# Patient Record
Sex: MALE | Race: WHITE | NOT HISPANIC OR LATINO | Employment: OTHER | URBAN - NONMETROPOLITAN AREA
[De-identification: names, ages, dates, MRNs, and addresses within clinical notes are randomized per-mention and may not be internally consistent; named-entity substitution may affect disease eponyms.]

---

## 2023-03-02 PROBLEM — R10.9 ABDOMINAL PAIN: Status: ACTIVE | Noted: 2023-03-02

## 2023-03-02 PROBLEM — R14.0 ABDOMINAL DISTENSION: Status: ACTIVE | Noted: 2023-03-02

## 2023-03-02 PROBLEM — K59.00 CONSTIPATION: Status: ACTIVE | Noted: 2023-03-02

## 2023-03-02 PROBLEM — R06.02 SHORTNESS OF BREATH: Status: ACTIVE | Noted: 2023-03-02

## 2023-03-02 PROBLEM — I12.9 BENIGN HYPERTENSION WITH CKD (CHRONIC KIDNEY DISEASE) STAGE III (MULTI): Status: ACTIVE | Noted: 2023-03-02

## 2023-03-02 PROBLEM — E66.3 OVERWEIGHT WITH BODY MASS INDEX (BMI) OF 29 TO 29.9 IN ADULT: Status: ACTIVE | Noted: 2023-03-02

## 2023-03-02 PROBLEM — N13.8 BENIGN PROSTATIC HYPERPLASIA WITH URINARY OBSTRUCTION AND OTHER LOWER URINARY TRACT SYMPTOMS: Status: ACTIVE | Noted: 2023-03-02

## 2023-03-02 PROBLEM — N18.30 BENIGN HYPERTENSION WITH CKD (CHRONIC KIDNEY DISEASE) STAGE III (MULTI): Status: ACTIVE | Noted: 2023-03-02

## 2023-03-02 PROBLEM — R19.8 CHANGE IN BOWEL MOVEMENT: Status: ACTIVE | Noted: 2023-03-02

## 2023-03-02 PROBLEM — Z86.010 PERSONAL HISTORY OF COLONIC POLYPS: Status: ACTIVE | Noted: 2023-03-02

## 2023-03-02 PROBLEM — Z86.0100 PERSONAL HISTORY OF COLONIC POLYPS: Status: ACTIVE | Noted: 2023-03-02

## 2023-03-02 PROBLEM — K21.9 GERD (GASTROESOPHAGEAL REFLUX DISEASE): Status: ACTIVE | Noted: 2023-03-02

## 2023-03-02 PROBLEM — S39.012A LUMBAR STRAIN: Status: ACTIVE | Noted: 2023-03-02

## 2023-03-02 PROBLEM — E78.5 HYPERLIPIDEMIA: Status: ACTIVE | Noted: 2023-03-02

## 2023-03-02 PROBLEM — R35.1 NOCTURIA: Status: ACTIVE | Noted: 2023-03-02

## 2023-03-02 PROBLEM — R09.89 CAROTID BRUIT: Status: ACTIVE | Noted: 2023-03-02

## 2023-03-02 PROBLEM — N40.1 BENIGN PROSTATIC HYPERPLASIA WITH URINARY OBSTRUCTION AND OTHER LOWER URINARY TRACT SYMPTOMS: Status: ACTIVE | Noted: 2023-03-02

## 2023-03-02 PROBLEM — I10 HTN (HYPERTENSION): Status: ACTIVE | Noted: 2023-03-02

## 2023-03-02 PROBLEM — N52.9 ERECTILE DYSFUNCTION: Status: ACTIVE | Noted: 2023-03-02

## 2023-03-02 PROBLEM — I73.00 RAYNAUD'S DISEASE: Status: ACTIVE | Noted: 2023-03-02

## 2023-03-02 RX ORDER — ATORVASTATIN CALCIUM 10 MG/1
1 TABLET, FILM COATED ORAL NIGHTLY
COMMUNITY
Start: 2019-01-16 | End: 2023-06-19 | Stop reason: SDUPTHER

## 2023-03-02 RX ORDER — ASPIRIN 81 MG/1
1 TABLET ORAL DAILY
COMMUNITY
Start: 2019-10-28

## 2023-03-02 RX ORDER — LOSARTAN POTASSIUM 50 MG/1
1 TABLET ORAL DAILY
COMMUNITY
Start: 2018-10-31 | End: 2023-11-13 | Stop reason: SDUPTHER

## 2023-03-02 RX ORDER — CARVEDILOL 6.25 MG/1
1 TABLET ORAL 2 TIMES DAILY
COMMUNITY
Start: 2019-01-14 | End: 2023-11-13 | Stop reason: SDUPTHER

## 2023-03-02 RX ORDER — SILDENAFIL CITRATE 20 MG/1
20 TABLET ORAL
COMMUNITY
Start: 2019-01-14 | End: 2023-11-13 | Stop reason: SDUPTHER

## 2023-03-02 RX ORDER — CILOSTAZOL 100 MG/1
1 TABLET ORAL 2 TIMES DAILY
COMMUNITY
Start: 2019-05-16 | End: 2023-11-13 | Stop reason: SDUPTHER

## 2023-03-02 RX ORDER — PRAZOSIN HYDROCHLORIDE 1 MG/1
1 CAPSULE ORAL 2 TIMES DAILY
COMMUNITY
Start: 2018-11-09 | End: 2023-11-13 | Stop reason: SDUPTHER

## 2023-03-02 RX ORDER — OMEPRAZOLE 40 MG/1
1 CAPSULE, DELAYED RELEASE ORAL DAILY
COMMUNITY
Start: 2018-12-20 | End: 2023-11-13 | Stop reason: SDUPTHER

## 2023-03-06 ENCOUNTER — OFFICE VISIT (OUTPATIENT)
Dept: PRIMARY CARE | Facility: CLINIC | Age: 73
End: 2023-03-06
Payer: MEDICARE

## 2023-03-06 VITALS
HEART RATE: 68 BPM | WEIGHT: 177 LBS | BODY MASS INDEX: 29.45 KG/M2 | OXYGEN SATURATION: 97 % | DIASTOLIC BLOOD PRESSURE: 73 MMHG | SYSTOLIC BLOOD PRESSURE: 133 MMHG

## 2023-03-06 DIAGNOSIS — R10.32 ACUTE LEFT LOWER QUADRANT PAIN: Primary | ICD-10-CM

## 2023-03-06 PROCEDURE — 1159F MED LIST DOCD IN RCRD: CPT | Performed by: PHYSICIAN ASSISTANT

## 2023-03-06 PROCEDURE — 99214 OFFICE O/P EST MOD 30 MIN: CPT | Performed by: PHYSICIAN ASSISTANT

## 2023-03-06 PROCEDURE — 3075F SYST BP GE 130 - 139MM HG: CPT | Performed by: PHYSICIAN ASSISTANT

## 2023-03-06 PROCEDURE — 1036F TOBACCO NON-USER: CPT | Performed by: PHYSICIAN ASSISTANT

## 2023-03-06 PROCEDURE — 3078F DIAST BP <80 MM HG: CPT | Performed by: PHYSICIAN ASSISTANT

## 2023-03-06 RX ORDER — AMOXICILLIN AND CLAVULANATE POTASSIUM 875; 125 MG/1; MG/1
1 TABLET, FILM COATED ORAL 2 TIMES DAILY
Qty: 20 TABLET | Refills: 0 | Status: SHIPPED | OUTPATIENT
Start: 2023-03-06 | End: 2023-03-16

## 2023-03-06 NOTE — PROGRESS NOTES
Subjective   Patient ID: Yohan Mccain is a 72 y.o. male who presents for Abdominal Pain (Left side), Urinary Retention, and Constipation.    HPI    Patient presents for evaluation of left lower quadrant pain.  Patient reports 2 to 3 months of pain in the left lower quadrant that waxes and wanes with associated diarrhea.  Patient reports some possible instances of constipation but mostly has had liquid stool over the past 2 to 3 months.  Patient denies fever or chills.  No melena or bright red blood per rectum.  No nausea or vomiting however, patient does report new food intolerances.  Patient states that there is associated bloating, bloating is bad, there is associated urinary retention that is relieved with bowel movement.  No fecaluria or pneumaturia.  Patient does have a history of colon polyps but had colonoscopy just over 1 year ago and was informed that follow-up would not be for 5 years.    Review of Systems    Review of Systems   Constitutional:  See HPI .    Gastrointestinal: See HPI  Genitourinary: See HPI  Neurologic:  Alert and oriented X4, No numbness, No tingling.    All other systems are negative       Objective     /73   Pulse 68   Wt 80.3 kg (177 lb)   SpO2 97%   BMI 29.45 kg/m²     Physical Exam    General:  Alert and oriented, No acute distress.    Gastrointestinal: Left lower quadrant tenderness to palpation with involuntary guarding; no rebound or referred  Integumentary:  Warm, Dry, Intact, No pallor, No rash.    Neurologic:  Alert, Oriented, Normal sensory, Normal motor function, No focal deficits, Cranial Nerves II-XII are grossly intact,   Psychiatric:  Cooperative, Appropriate mood & affect.      Assessment/Plan     Acute left lower quadrant abdominal pain: Suspect subacute diverticulitis.  Start Augmentin twice daily x7 days.  CT abdomen pelvis with oral IV contrast ordered with CBC BMP, LFTs.  Patient advised that should fever present or pain worsen, to ER for further  evaluation.    Problem List Items Addressed This Visit    None  Visit Diagnoses       Acute left lower quadrant pain    -  Primary    Relevant Medications    amoxicillin-pot clavulanate (Augmentin) 875-125 mg tablet    Other Relevant Orders    CT abdomen pelvis w IV contrast    CBC    Basic Metabolic Panel    Hepatic Function Panel

## 2023-03-13 ENCOUNTER — LAB (OUTPATIENT)
Dept: LAB | Facility: LAB | Age: 73
End: 2023-03-13
Payer: MEDICARE

## 2023-03-13 DIAGNOSIS — R10.32 ACUTE LEFT LOWER QUADRANT PAIN: ICD-10-CM

## 2023-03-13 LAB
ALANINE AMINOTRANSFERASE (SGPT) (U/L) IN SER/PLAS: 12 U/L (ref 10–52)
ALBUMIN (G/DL) IN SER/PLAS: 4.3 G/DL (ref 3.4–5)
ALKALINE PHOSPHATASE (U/L) IN SER/PLAS: 41 U/L (ref 33–136)
ANION GAP IN SER/PLAS: 12 MMOL/L (ref 10–20)
ASPARTATE AMINOTRANSFERASE (SGOT) (U/L) IN SER/PLAS: 12 U/L (ref 9–39)
BILIRUBIN DIRECT (MG/DL) IN SER/PLAS: 0.2 MG/DL (ref 0–0.3)
BILIRUBIN TOTAL (MG/DL) IN SER/PLAS: 0.8 MG/DL (ref 0–1.2)
CALCIUM (MG/DL) IN SER/PLAS: 9.7 MG/DL (ref 8.6–10.3)
CARBON DIOXIDE, TOTAL (MMOL/L) IN SER/PLAS: 28 MMOL/L (ref 21–32)
CHLORIDE (MMOL/L) IN SER/PLAS: 104 MMOL/L (ref 98–107)
CREATININE (MG/DL) IN SER/PLAS: 1.36 MG/DL (ref 0.5–1.3)
ERYTHROCYTE DISTRIBUTION WIDTH (RATIO) BY AUTOMATED COUNT: 13.2 % (ref 11.5–14.5)
ERYTHROCYTE MEAN CORPUSCULAR HEMOGLOBIN CONCENTRATION (G/DL) BY AUTOMATED: 31.8 G/DL (ref 32–36)
ERYTHROCYTE MEAN CORPUSCULAR VOLUME (FL) BY AUTOMATED COUNT: 93 FL (ref 80–100)
ERYTHROCYTES (10*6/UL) IN BLOOD BY AUTOMATED COUNT: 4.45 X10E12/L (ref 4.5–5.9)
GFR MALE: 56 ML/MIN/1.73M2
GLUCOSE (MG/DL) IN SER/PLAS: 91 MG/DL (ref 74–99)
HEMATOCRIT (%) IN BLOOD BY AUTOMATED COUNT: 41.2 % (ref 41–52)
HEMOGLOBIN (G/DL) IN BLOOD: 13.1 G/DL (ref 13.5–17.5)
LEUKOCYTES (10*3/UL) IN BLOOD BY AUTOMATED COUNT: 4.7 X10E9/L (ref 4.4–11.3)
PLATELETS (10*3/UL) IN BLOOD AUTOMATED COUNT: 250 X10E9/L (ref 150–450)
POTASSIUM (MMOL/L) IN SER/PLAS: 4.6 MMOL/L (ref 3.5–5.3)
PROTEIN TOTAL: 6.5 G/DL (ref 6.4–8.2)
SODIUM (MMOL/L) IN SER/PLAS: 139 MMOL/L (ref 136–145)
UREA NITROGEN (MG/DL) IN SER/PLAS: 15 MG/DL (ref 6–23)

## 2023-03-13 PROCEDURE — 85027 COMPLETE CBC AUTOMATED: CPT

## 2023-03-13 PROCEDURE — 80048 BASIC METABOLIC PNL TOTAL CA: CPT

## 2023-03-13 PROCEDURE — 80076 HEPATIC FUNCTION PANEL: CPT

## 2023-03-13 PROCEDURE — 36415 COLL VENOUS BLD VENIPUNCTURE: CPT

## 2023-03-14 ENCOUNTER — TELEPHONE (OUTPATIENT)
Dept: PRIMARY CARE | Facility: CLINIC | Age: 73
End: 2023-03-14
Payer: MEDICARE

## 2023-03-14 NOTE — TELEPHONE ENCOUNTER
----- Message from Bobby Villela PA-C sent at 3/14/2023 10:11 AM EDT -----  Patient's labs are unremarkable.  Patient's CT abdomen showed chronic fat stranding in the left lower quadrant, etiology is unclear.  This is similar to CT findings in 2021 for same complaint.  Patient was referred to general surgery at that time and is still a patient there.  Recommend contacting them regarding the CT results and chronic abdominal pain.  The patient does not need a referral.  Please contact patient with results and plan.  Thank you.

## 2023-06-19 ENCOUNTER — OFFICE VISIT (OUTPATIENT)
Dept: PRIMARY CARE | Facility: CLINIC | Age: 73
End: 2023-06-19
Payer: MEDICARE

## 2023-06-19 VITALS
BODY MASS INDEX: 29.82 KG/M2 | HEIGHT: 65 IN | SYSTOLIC BLOOD PRESSURE: 125 MMHG | HEART RATE: 77 BPM | DIASTOLIC BLOOD PRESSURE: 80 MMHG | WEIGHT: 179 LBS

## 2023-06-19 DIAGNOSIS — E78.2 MIXED HYPERLIPIDEMIA: ICD-10-CM

## 2023-06-19 DIAGNOSIS — K21.9 GASTROESOPHAGEAL REFLUX DISEASE WITHOUT ESOPHAGITIS: ICD-10-CM

## 2023-06-19 DIAGNOSIS — N40.1 BENIGN PROSTATIC HYPERPLASIA WITH URINARY OBSTRUCTION AND OTHER LOWER URINARY TRACT SYMPTOMS: ICD-10-CM

## 2023-06-19 DIAGNOSIS — Z01.818 PRE-OP EXAM: Primary | ICD-10-CM

## 2023-06-19 DIAGNOSIS — I10 PRIMARY HYPERTENSION: ICD-10-CM

## 2023-06-19 DIAGNOSIS — N13.8 BENIGN PROSTATIC HYPERPLASIA WITH URINARY OBSTRUCTION AND OTHER LOWER URINARY TRACT SYMPTOMS: ICD-10-CM

## 2023-06-19 DIAGNOSIS — I73.00 RAYNAUD'S DISEASE WITHOUT GANGRENE: ICD-10-CM

## 2023-06-19 PROCEDURE — 3074F SYST BP LT 130 MM HG: CPT | Performed by: INTERNAL MEDICINE

## 2023-06-19 PROCEDURE — 1160F RVW MEDS BY RX/DR IN RCRD: CPT | Performed by: INTERNAL MEDICINE

## 2023-06-19 PROCEDURE — 1159F MED LIST DOCD IN RCRD: CPT | Performed by: INTERNAL MEDICINE

## 2023-06-19 PROCEDURE — 99214 OFFICE O/P EST MOD 30 MIN: CPT | Performed by: INTERNAL MEDICINE

## 2023-06-19 PROCEDURE — 3079F DIAST BP 80-89 MM HG: CPT | Performed by: INTERNAL MEDICINE

## 2023-06-19 PROCEDURE — 1036F TOBACCO NON-USER: CPT | Performed by: INTERNAL MEDICINE

## 2023-06-19 RX ORDER — ATORVASTATIN CALCIUM 10 MG/1
10 TABLET, FILM COATED ORAL NIGHTLY
Qty: 90 TABLET | Refills: 3 | Status: SHIPPED | OUTPATIENT
Start: 2023-06-19

## 2023-06-19 ASSESSMENT — ENCOUNTER SYMPTOMS
BACK PAIN: 0
ABDOMINAL PAIN: 0
COUGH: 0
SINUS PRESSURE: 0
ABDOMINAL DISTENTION: 0
SHORTNESS OF BREATH: 0
ARTHRALGIAS: 0
ACTIVITY CHANGE: 0
CHILLS: 0

## 2023-06-19 NOTE — PROGRESS NOTES
"Subjective   Patient ID: Yohan Mccain is a 72 y.o. male who presents for Pre-op Exam (B/L upper lids ptosis surgery) and Follow-up (6 month).  HPI  Patient is here today for pre- op exam.  Patient is going to being having bilateral blepharoplasty, had lower eyelids done two months,. Patient reports no issues with anesthesia and the procedure.   Patient has no sob or chest pain on exertion.     Review of Systems   Constitutional:  Negative for activity change and chills.   HENT:  Negative for congestion, mouth sores, nosebleeds, sinus pressure and sneezing.    Respiratory:  Negative for cough and shortness of breath.    Cardiovascular:  Negative for chest pain and leg swelling.   Gastrointestinal:  Negative for abdominal distention and abdominal pain.   Musculoskeletal:  Negative for arthralgias and back pain.       Objective   /80 (BP Location: Left arm, Patient Position: Sitting, BP Cuff Size: Adult)   Pulse 77   Ht 1.651 m (5' 5\")   Wt 81.2 kg (179 lb)   BMI 29.79 kg/m²     Physical Exam  Constitutional:       General: He is not in acute distress.     Appearance: Normal appearance.   HENT:      Head: Normocephalic.      Nose: Nose normal.      Mouth/Throat:      Mouth: Mucous membranes are dry.      Pharynx: No oropharyngeal exudate.   Eyes:      General:         Right eye: No discharge.         Left eye: No discharge.      Extraocular Movements: Extraocular movements intact.      Pupils: Pupils are equal, round, and reactive to light.   Cardiovascular:      Rate and Rhythm: Normal rate and regular rhythm.      Heart sounds: No murmur heard.     No gallop.   Pulmonary:      Effort: Pulmonary effort is normal. No respiratory distress.      Breath sounds: Normal breath sounds. No wheezing.   Musculoskeletal:         General: No swelling. Normal range of motion.   Skin:     General: Skin is warm and dry.      Coloration: Skin is not jaundiced.   Neurological:      General: No focal deficit present.      " Mental Status: He is alert and oriented to person, place, and time.      Cranial Nerves: No cranial nerve deficit.   Psychiatric:         Mood and Affect: Mood normal.         Behavior: Behavior normal.           Assessment/Plan   Problem List Items Addressed This Visit       Benign prostatic hyperplasia with urinary obstruction and other lower urinary tract symptoms    GERD (gastroesophageal reflux disease)    HTN (hypertension) - Primary    Hyperlipidemia    Raynaud's disease     Pre-op for therese blephoraoplasty  - normal bloodwork in March, normal heart testing in January, no chest pain or sob   - Pt at this time is medically optimized and is cleared to proceed with procedure     2. Mildly elevated Cr likely due to dehydration from diverticulitis, will repeat bmp     3. HTN, controlled   - continue losartan 50mg po daily     4. Raynauds, severe   - following with vascular   - on revatio 20mg po 5x a day   - on pletal 100mg po bid   - on coreg 6.25mg po daily   - on asa 81mg po daily     5. HLD   - on lipitor 10mg po daily         Final diagnoses:   [I10] Primary hypertension   [I73.00] Raynaud's disease without gangrene   [K21.9] Gastroesophageal reflux disease without esophagitis   [N40.1, N13.8] Benign prostatic hyperplasia with urinary obstruction and other lower urinary tract symptoms   [E78.2] Mixed hyperlipidemia

## 2023-07-06 ENCOUNTER — LAB (OUTPATIENT)
Dept: LAB | Facility: LAB | Age: 73
End: 2023-07-06
Payer: MEDICARE

## 2023-07-06 DIAGNOSIS — I10 PRIMARY HYPERTENSION: ICD-10-CM

## 2023-07-06 LAB
ANION GAP IN SER/PLAS: 9 MMOL/L (ref 10–20)
CALCIUM (MG/DL) IN SER/PLAS: 9.9 MG/DL (ref 8.6–10.3)
CARBON DIOXIDE, TOTAL (MMOL/L) IN SER/PLAS: 29 MMOL/L (ref 21–32)
CHLORIDE (MMOL/L) IN SER/PLAS: 104 MMOL/L (ref 98–107)
CREATININE (MG/DL) IN SER/PLAS: 1.6 MG/DL (ref 0.5–1.3)
GFR MALE: 46 ML/MIN/1.73M2
GLUCOSE (MG/DL) IN SER/PLAS: 103 MG/DL (ref 74–99)
POTASSIUM (MMOL/L) IN SER/PLAS: 5 MMOL/L (ref 3.5–5.3)
SODIUM (MMOL/L) IN SER/PLAS: 137 MMOL/L (ref 136–145)
UREA NITROGEN (MG/DL) IN SER/PLAS: 18 MG/DL (ref 6–23)

## 2023-07-06 PROCEDURE — 36415 COLL VENOUS BLD VENIPUNCTURE: CPT

## 2023-07-06 PROCEDURE — 80048 BASIC METABOLIC PNL TOTAL CA: CPT

## 2023-11-13 DIAGNOSIS — R09.89 CAROTID BRUIT, UNSPECIFIED LATERALITY: ICD-10-CM

## 2023-11-13 DIAGNOSIS — I10 PRIMARY HYPERTENSION: Primary | ICD-10-CM

## 2023-11-13 DIAGNOSIS — N52.8 OTHER MALE ERECTILE DYSFUNCTION: ICD-10-CM

## 2023-11-13 DIAGNOSIS — N13.8 BENIGN PROSTATIC HYPERPLASIA WITH URINARY OBSTRUCTION AND OTHER LOWER URINARY TRACT SYMPTOMS: ICD-10-CM

## 2023-11-13 DIAGNOSIS — N40.1 BENIGN PROSTATIC HYPERPLASIA WITH URINARY OBSTRUCTION AND OTHER LOWER URINARY TRACT SYMPTOMS: ICD-10-CM

## 2023-11-13 DIAGNOSIS — K21.9 GASTROESOPHAGEAL REFLUX DISEASE WITHOUT ESOPHAGITIS: ICD-10-CM

## 2023-11-13 RX ORDER — LOSARTAN POTASSIUM 50 MG/1
50 TABLET ORAL DAILY
Qty: 90 TABLET | Refills: 3 | Status: SHIPPED | OUTPATIENT
Start: 2023-11-13 | End: 2024-01-22 | Stop reason: SDUPTHER

## 2023-11-13 RX ORDER — PRAZOSIN HYDROCHLORIDE 1 MG/1
1 CAPSULE ORAL 2 TIMES DAILY
Qty: 180 CAPSULE | Refills: 3 | Status: SHIPPED | OUTPATIENT
Start: 2023-11-13

## 2023-11-13 RX ORDER — CARVEDILOL 6.25 MG/1
6.25 TABLET ORAL 2 TIMES DAILY
Qty: 90 TABLET | Refills: 3 | Status: SHIPPED | OUTPATIENT
Start: 2023-11-13

## 2023-11-13 RX ORDER — CILOSTAZOL 100 MG/1
100 TABLET ORAL 2 TIMES DAILY
Qty: 180 TABLET | Refills: 3 | Status: SHIPPED | OUTPATIENT
Start: 2023-11-13

## 2023-11-13 RX ORDER — SILDENAFIL CITRATE 20 MG/1
20 TABLET ORAL
Qty: 30 TABLET | Refills: 11 | Status: SHIPPED | OUTPATIENT
Start: 2023-11-13 | End: 2023-12-06 | Stop reason: SDUPTHER

## 2023-11-13 RX ORDER — OMEPRAZOLE 40 MG/1
40 CAPSULE, DELAYED RELEASE ORAL DAILY
Qty: 90 CAPSULE | Refills: 3 | Status: SHIPPED | OUTPATIENT
Start: 2023-11-13

## 2023-12-05 ENCOUNTER — LAB (OUTPATIENT)
Dept: LAB | Facility: LAB | Age: 73
End: 2023-12-05
Payer: MEDICARE

## 2023-12-05 DIAGNOSIS — N40.1 BENIGN PROSTATIC HYPERPLASIA WITH LOWER URINARY TRACT SYMPTOMS: Primary | ICD-10-CM

## 2023-12-05 LAB — PSA SERPL-MCNC: 1.07 NG/ML

## 2023-12-05 PROCEDURE — 36415 COLL VENOUS BLD VENIPUNCTURE: CPT

## 2023-12-05 PROCEDURE — 84153 ASSAY OF PSA TOTAL: CPT

## 2023-12-06 DIAGNOSIS — N52.8 OTHER MALE ERECTILE DYSFUNCTION: ICD-10-CM

## 2023-12-06 DIAGNOSIS — I73.00 RAYNAUD'S DISEASE WITHOUT GANGRENE: ICD-10-CM

## 2023-12-06 RX ORDER — SILDENAFIL CITRATE 20 MG/1
20 TABLET ORAL
Qty: 150 TABLET | Refills: 11 | Status: SHIPPED | OUTPATIENT
Start: 2023-12-06

## 2023-12-13 ENCOUNTER — OFFICE VISIT (OUTPATIENT)
Dept: UROLOGY | Facility: CLINIC | Age: 73
End: 2023-12-13
Payer: MEDICARE

## 2023-12-13 VITALS — BODY MASS INDEX: 29.29 KG/M2 | RESPIRATION RATE: 16 BRPM | WEIGHT: 176 LBS

## 2023-12-13 DIAGNOSIS — N13.8 BENIGN PROSTATIC HYPERPLASIA WITH URINARY OBSTRUCTION AND OTHER LOWER URINARY TRACT SYMPTOMS: ICD-10-CM

## 2023-12-13 DIAGNOSIS — N40.1 BENIGN PROSTATIC HYPERPLASIA WITH URINARY OBSTRUCTION AND OTHER LOWER URINARY TRACT SYMPTOMS: ICD-10-CM

## 2023-12-13 DIAGNOSIS — R35.1 NOCTURIA: ICD-10-CM

## 2023-12-13 DIAGNOSIS — N52.9 ERECTILE DYSFUNCTION, UNSPECIFIED ERECTILE DYSFUNCTION TYPE: ICD-10-CM

## 2023-12-13 PROCEDURE — 1160F RVW MEDS BY RX/DR IN RCRD: CPT | Performed by: UROLOGY

## 2023-12-13 PROCEDURE — 1036F TOBACCO NON-USER: CPT | Performed by: UROLOGY

## 2023-12-13 PROCEDURE — 1159F MED LIST DOCD IN RCRD: CPT | Performed by: UROLOGY

## 2023-12-13 PROCEDURE — 99214 OFFICE O/P EST MOD 30 MIN: CPT | Performed by: UROLOGY

## 2023-12-13 ASSESSMENT — ENCOUNTER SYMPTOMS
PSYCHIATRIC NEGATIVE: 1
NAUSEA: 0
SHORTNESS OF BREATH: 0
CHILLS: 0
DIFFICULTY URINATING: 0
COUGH: 0
FEVER: 0
EYES NEGATIVE: 1
ENDOCRINE NEGATIVE: 1
ALLERGIC/IMMUNOLOGIC NEGATIVE: 1

## 2023-12-13 NOTE — PROGRESS NOTES
Subjective   Patient ID: Yohan Mccain is a 73 y.o. male.    HPI  Patient is here for yearly f/u. Most recent PSA was 1.07 on 12/23. Prior PSA was 0.91 on 12/22. Prior PSA was 0.67 on 11/2021. Prior PSA was 0.72 on 12/2020. Chronic BPH sx are mild and stable. Some urgency and frequency. Denies dysuria. Denies hematuria. Nocturia x1. No medications for the prostate. ED is mild. Patient is taking Sildenafil for Raynaud's disease.       Review of Systems   Constitutional:  Negative for chills and fever.   HENT: Negative.     Eyes: Negative.    Respiratory:  Negative for cough and shortness of breath.    Cardiovascular:  Negative for chest pain and leg swelling.   Gastrointestinal:  Negative for nausea.   Endocrine: Negative.    Genitourinary:  Negative for difficulty urinating.        Negative except for documented in HPI   Allergic/Immunologic: Negative.    Neurological:         Alert & oriented X 3   Hematological:         Denies blood thinners   Psychiatric/Behavioral: Negative.         Objective   Physical Exam  Vitals and nursing note reviewed.   Constitutional:       General: He is not in acute distress.     Appearance: Normal appearance.   Pulmonary:      Effort: Pulmonary effort is normal.   Abdominal:      Tenderness: There is no abdominal tenderness.   Genitourinary:     Comments: Kidneys non palpable bilaterally  Bladder non palpable or tender  Scrotum no mass, No hydrocele  Epididymis- No spermatocele. Non Tender.  Testicles: No mass. WNL  Urethra: No discharge  Penis within normal limits... No lesions. Circumcised  Prostate - symmetric, no nodules. Smooth  Seminal Vesicals: No mass.  Sphincter tone: normal  Neurological:      Mental Status: He is alert.         Assessment/Plan       Diagnoses and all orders for this visit:  Benign prostatic hyperplasia with urinary obstruction and other lower urinary tract symptoms  Erectile dysfunction, unspecified erectile dysfunction type  Nocturia    All available  PSA values reviewed, Options discussed. Questions answered.   Diet changes for prostate health discussed and educational information given. Pros/Cons of prostate health supplements discussed.   Treatment options for LUTS reviewed  Discussed timed voiding. Discussed fluid and caffeine intake  Treatment options for ED reviewed.  Continue Sildenafil-patient actually uses for Raynauds  Lifestyle change to help prevent UTIs discussed. Encouraged fluid intake.    F/U with PSA 1 year

## 2023-12-18 ENCOUNTER — OFFICE VISIT (OUTPATIENT)
Dept: PRIMARY CARE | Facility: CLINIC | Age: 73
End: 2023-12-18
Payer: MEDICARE

## 2023-12-18 VITALS
WEIGHT: 178 LBS | HEART RATE: 67 BPM | BODY MASS INDEX: 29.66 KG/M2 | DIASTOLIC BLOOD PRESSURE: 76 MMHG | SYSTOLIC BLOOD PRESSURE: 124 MMHG | HEIGHT: 65 IN

## 2023-12-18 DIAGNOSIS — N18.30 BENIGN HYPERTENSION WITH CKD (CHRONIC KIDNEY DISEASE) STAGE III (MULTI): ICD-10-CM

## 2023-12-18 DIAGNOSIS — I10 PRIMARY HYPERTENSION: ICD-10-CM

## 2023-12-18 DIAGNOSIS — I73.00 RAYNAUD'S DISEASE WITHOUT GANGRENE: ICD-10-CM

## 2023-12-18 DIAGNOSIS — Z87.891 FORMER SMOKER: ICD-10-CM

## 2023-12-18 DIAGNOSIS — N40.1 BENIGN PROSTATIC HYPERPLASIA WITH URINARY OBSTRUCTION AND OTHER LOWER URINARY TRACT SYMPTOMS: ICD-10-CM

## 2023-12-18 DIAGNOSIS — I12.9 BENIGN HYPERTENSION WITH CKD (CHRONIC KIDNEY DISEASE) STAGE III (MULTI): ICD-10-CM

## 2023-12-18 DIAGNOSIS — N52.9 ERECTILE DYSFUNCTION, UNSPECIFIED ERECTILE DYSFUNCTION TYPE: ICD-10-CM

## 2023-12-18 DIAGNOSIS — K21.9 GASTROESOPHAGEAL REFLUX DISEASE WITHOUT ESOPHAGITIS: ICD-10-CM

## 2023-12-18 DIAGNOSIS — Z12.2 SCREENING FOR LUNG CANCER: ICD-10-CM

## 2023-12-18 DIAGNOSIS — Z00.00 MEDICARE ANNUAL WELLNESS VISIT, SUBSEQUENT: ICD-10-CM

## 2023-12-18 DIAGNOSIS — E78.2 MIXED HYPERLIPIDEMIA: Primary | ICD-10-CM

## 2023-12-18 DIAGNOSIS — N13.8 BENIGN PROSTATIC HYPERPLASIA WITH URINARY OBSTRUCTION AND OTHER LOWER URINARY TRACT SYMPTOMS: ICD-10-CM

## 2023-12-18 PROCEDURE — 3074F SYST BP LT 130 MM HG: CPT | Performed by: INTERNAL MEDICINE

## 2023-12-18 PROCEDURE — 1160F RVW MEDS BY RX/DR IN RCRD: CPT | Performed by: INTERNAL MEDICINE

## 2023-12-18 PROCEDURE — 99214 OFFICE O/P EST MOD 30 MIN: CPT | Performed by: INTERNAL MEDICINE

## 2023-12-18 PROCEDURE — 3078F DIAST BP <80 MM HG: CPT | Performed by: INTERNAL MEDICINE

## 2023-12-18 PROCEDURE — G0439 PPPS, SUBSEQ VISIT: HCPCS | Performed by: INTERNAL MEDICINE

## 2023-12-18 PROCEDURE — G0444 DEPRESSION SCREEN ANNUAL: HCPCS | Performed by: INTERNAL MEDICINE

## 2023-12-18 PROCEDURE — 1036F TOBACCO NON-USER: CPT | Performed by: INTERNAL MEDICINE

## 2023-12-18 PROCEDURE — 1159F MED LIST DOCD IN RCRD: CPT | Performed by: INTERNAL MEDICINE

## 2023-12-18 NOTE — PROGRESS NOTES
Chief Complaint: Medicare Wellness Exam/Comprehensive Problem Focused Follow Up and Physical Exam    HPI:  Patient is here today for MW.   Patient reports that he is doing well.       Active Problem List  Patient Active Problem List   Diagnosis    Abdominal distension    Abdominal pain    Benign hypertension with CKD (chronic kidney disease) stage III (CMS/HCC)    Benign prostatic hyperplasia with urinary obstruction and other lower urinary tract symptoms    Carotid bruit    Change in bowel movement    Constipation    Erectile dysfunction    GERD (gastroesophageal reflux disease)    HTN (hypertension)    Hyperlipidemia    Lumbar strain    Nocturia    Personal history of colonic polyps    Shortness of breath    Raynaud's disease    Overweight with body mass index (BMI) of 29 to 29.9 in adult       Comprehensive Medical/Surgical/Social/Family History  History reviewed. No pertinent past medical history.  Past Surgical History:   Procedure Laterality Date    OTHER SURGICAL HISTORY  10/28/2019    Cataract surgery    OTHER SURGICAL HISTORY  10/28/2019    Colonoscopy    OTHER SURGICAL HISTORY  10/28/2019    Esophagogastroduodenoscopy    OTHER SURGICAL HISTORY  10/28/2019    Back surgery    OTHER SURGICAL HISTORY  06/22/2021    Colonoscopy     Social History     Tobacco Use    Smoking status: Former     Types: Cigarettes    Smokeless tobacco: Former   Vaping Use    Vaping Use: Never used   Substance Use Topics    Alcohol use: Not Currently    Drug use: Never     Family History   Problem Relation Name Age of Onset    Kidney cancer Mother          s/p nephrectomy and chemo    Coronary artery disease Father          multiple small MIs    Heart attack Father      Colon cancer Paternal Great-Grandmother           Allergies and Medications  Patient has no known allergies.  Current Outpatient Medications on File Prior to Visit   Medication Sig Dispense Refill    aspirin 81 mg EC tablet Take 1 tablet (81 mg) by mouth once daily.  As directed      atorvastatin (Lipitor) 10 mg tablet Take 1 tablet (10 mg) by mouth once daily at bedtime. 90 tablet 3    carvedilol (Coreg) 6.25 mg tablet Take 1 tablet (6.25 mg) by mouth 2 times a day. 90 tablet 3    cilostazol (Pletal) 100 mg tablet Take 1 tablet (100 mg) by mouth 2 times a day. 180 tablet 3    losartan (Cozaar) 50 mg tablet Take 1 tablet (50 mg) by mouth once daily. As directed 90 tablet 3    omeprazole (PriLOSEC) 40 mg DR capsule Take 1 capsule (40 mg) by mouth once daily. 90 capsule 3    prazosin (Minipress) 1 mg capsule Take 1 capsule (1 mg) by mouth 2 times a day. 180 capsule 3    sildenafil (Revatio) 20 mg tablet Take 1 tablet (20 mg) by mouth 5 times a day. (2 tabs in morning/ 1 tab at noon / 2 tabs in evening) 150 tablet 11     No current facility-administered medications on file prior to visit.       Medicare Wellness Questionnaire        How have you been on Medicare less than a year ? No  Have you had a Medicare Wellness exam before ? Yes  Have you had any surgeries in the last year ? No  Have you developed any new diseases in the last year ? No  Have any close family members developed new diseases in the last year ? No  Have you been to a the hospital in the last year ? No  Do you take any pills or supplements other than those prescribed for you ? No  Do you take any opiates for pain such as Tramadol, Percocet or Norco ? No  How do you consider your overall health ?Good  Have you ever used tobacco products ? Yes   Have you smoked more than 100 cigarettes in your life ?  Yes  What form of tobacco have you used ? Cigarettes  How many packs per day ? 2 ppd  How many years ? 56 years   Have you quit ? Yes  Do you drink alcohol ?  No   Have you ever used illegal drugs at anytime in your life including Marijuana ? No   Which of the following describes your diet ?Heart healthy  How many days per week on average do you exercise ? Active but no clear exercise  days  Do you have any loss of  "hearing ? No  Do you have hearing aids ? No  Have you or others noted you have loss of memory ? No  Do you need someone to assist you with any of the following ? None   Do you need someone to assist you with any of the following ? None   Have you fallen in the last 6 months ? No  Do you have any of the following in your house ?  None   Do you have a living will ? No  Do you have a durable power of  for health care decisions ? No    Medications and Supplements  prescribed by me and other practitioners or clinical pharmacist (such as prescriptions, OTC's, herbal therapies and supplements) were reviewed and documented in the medical record.    Tobacco/Alcohol/Opioid use, as well as Illicit Drug Use was screened for/reviewed and documented in Social History section and medication list as appropriate  Activities of Daily Living  In your present state of health, do you have any difficulty performing the following activities?:   Preparing food and eating?: No  Bathing yourself: No  Getting dressed: No  Using the toilet:No  Moving around from place to place: No  In the past year have you fallen or had a near fall?:No    Depression Screen  (Note: if answer to either of the following is \"Yes\", then a more complete depression screening is indicated)   Q1: Over the past two weeks, have you felt down, depressed or hopeless?    No   Q2: Over the past two weeks, have you felt little interest or pleasure in doing things?   no    Current exercise habits: Home exercise routine includes walking 1 hrs per day.   Dietary issues discussed: Yes  Hearing difficulties: No  Safe in current home environment: yes  Visual Acuity assessed: no  Cognitive Impairment assessed: yes       Advance directives  Advanced Care Planning (including a Living Will, Healthcare POA, as well as specific end of life choices and/or directives), was discussed for approximately 5 minutes with the patient and/or surrogate, voluntarily, and documented in the " "medical record.     Cardiac Risk Assessment  Cardiovascular risk was discussed and, if needed, lifestyle modifications recommended, including nutritional choices, exercise, and elimination of habits contributing to risk. We agreed on a plan to reduce the current cardiovascular risk based on above discussion as needed.  Aspirin use/disuse was discussed after reviewing the updated guidelines below:    Consider low dose Aspirin ( mg) use if the benefit for cardiovascular disease prevention outweighs risk for bleeding complications.   In general, low dose ASA should be considered:  In patients WITHOUT prior MI/stroke/PAD (primary prevention):   a. Age <60: Use if 10-year cardiovascular disease risk >20%, with discussion of risks and benefits with patient  b. Age 60-<70: Use if 10-year cardiovascular disease risk >20% and low bleeding (e.g., gastrointenstinal) risk, with discussion of risks and benefits with patient  c. Age >=70: Do not use    In patients WITH prior MI/stroke/PAD (secondary prevention):   Generally use unless extremely high bleeding (e.g., gastrointenstinal) risk, with discussion of risks and benefits with patient    ROS otherwise negative aside from what was mentioned above in HPI.    Vitals  /76   Pulse 67   Ht 1.651 m (5' 5\")   Wt 80.7 kg (178 lb)   BMI 29.62 kg/m²   Body mass index is 29.62 kg/m².  Physical Exam  Gen: Alert, NAD  HEENT:  PERRLA, EOMI, conjunctiva and sclera normal in appearance.   Neck:  Supple with FROM; No masses/nodes palpable; Thyroid nontender and without nodules; No LAKESHIA  Respiratory:  Lungs CTAB  Cardiovascular:  Heart RRR. No M/R/G. Peripheral pulses equal bilaterally  Abdomen:  Soft, nontender, BS present throughout; No R/G/R; No HSM or masses palpated  Extremities:  FROM all extremities; Muscle strength grossly normal with good tone  Neuro:  CN II-XII intact; Reflexes 2+/2+; Gross motor and sensory intact  Skin:  No suspicious lesions present    Flu shot " 2022, will get at the pharmacy   COVID received   PNA received 13, recommended 23 and 20   Shingles received   RSV recommended     Lung cancer screening ordered   Colonsocopy 2021   Psa up to date     Assessment and Plan:  Problem List Items Addressed This Visit       Benign hypertension with CKD (chronic kidney disease) stage III (CMS/HCC)    Overview     Chronic condition documentation: stable based on last GFR. Continue established treatment plan including avoidance of nephrotoxins and follow up at least yearly.         Benign prostatic hyperplasia with urinary obstruction and other lower urinary tract symptoms    Erectile dysfunction    GERD (gastroesophageal reflux disease)    HTN (hypertension)    Hyperlipidemia - Primary    Relevant Orders    Lipid Panel    Comprehensive Metabolic Panel    Raynaud's disease     Other Visit Diagnoses       Screening for lung cancer        Relevant Orders    CT lung screening low dose    Former smoker        Relevant Orders    CT lung screening low dose    Medicare annual wellness visit, subsequent             HTN, controlled   - continue losartan 50mg po daily      2. Raynauds, severe   - following with vascular   - on revatio 20mg po 5x a day   - on pletal 100mg po bid   - on coreg 6.25mg po daily   - on asa 81mg po daily      3.  HLD   - on lipitor 10mg po daily     During the course of the visit the patient was educated and counseled about age appropriate screening and preventive services. Completed preventive screenings were documented in the chart and orders were placed for outstanding screenings/procedures as documented in the Assessment and Plan.      Patient Instructions (the written plan) was given to the patient at check out.      Cheryl Gould DO

## 2023-12-21 ENCOUNTER — LAB (OUTPATIENT)
Dept: LAB | Facility: LAB | Age: 73
End: 2023-12-21
Payer: MEDICARE

## 2023-12-21 ENCOUNTER — HOSPITAL ENCOUNTER (OUTPATIENT)
Dept: RADIOLOGY | Facility: HOSPITAL | Age: 73
Discharge: HOME | End: 2023-12-21
Payer: MEDICARE

## 2023-12-21 DIAGNOSIS — Z12.2 SCREENING FOR LUNG CANCER: ICD-10-CM

## 2023-12-21 DIAGNOSIS — E78.2 MIXED HYPERLIPIDEMIA: ICD-10-CM

## 2023-12-21 DIAGNOSIS — Z87.891 FORMER SMOKER: ICD-10-CM

## 2023-12-21 LAB
ALBUMIN SERPL BCP-MCNC: 4.4 G/DL (ref 3.4–5)
ALP SERPL-CCNC: 49 U/L (ref 33–136)
ALT SERPL W P-5'-P-CCNC: 14 U/L (ref 10–52)
ANION GAP SERPL CALC-SCNC: 11 MMOL/L (ref 10–20)
AST SERPL W P-5'-P-CCNC: 13 U/L (ref 9–39)
BILIRUB SERPL-MCNC: 0.6 MG/DL (ref 0–1.2)
BUN SERPL-MCNC: 18 MG/DL (ref 6–23)
CALCIUM SERPL-MCNC: 9.6 MG/DL (ref 8.6–10.3)
CHLORIDE SERPL-SCNC: 107 MMOL/L (ref 98–107)
CHOLEST SERPL-MCNC: 152 MG/DL (ref 0–199)
CHOLESTEROL/HDL RATIO: 3.2
CO2 SERPL-SCNC: 28 MMOL/L (ref 21–32)
CREAT SERPL-MCNC: 1.34 MG/DL (ref 0.5–1.3)
GFR SERPL CREATININE-BSD FRML MDRD: 57 ML/MIN/1.73M*2
GLUCOSE SERPL-MCNC: 97 MG/DL (ref 74–99)
HDLC SERPL-MCNC: 48 MG/DL
LDLC SERPL CALC-MCNC: 95 MG/DL
NON HDL CHOLESTEROL: 104 MG/DL (ref 0–149)
POTASSIUM SERPL-SCNC: 4.5 MMOL/L (ref 3.5–5.3)
PROT SERPL-MCNC: 6.6 G/DL (ref 6.4–8.2)
SODIUM SERPL-SCNC: 141 MMOL/L (ref 136–145)
TRIGL SERPL-MCNC: 47 MG/DL (ref 0–149)
VLDL: 9 MG/DL (ref 0–40)

## 2023-12-21 PROCEDURE — 80061 LIPID PANEL: CPT

## 2023-12-21 PROCEDURE — 80053 COMPREHEN METABOLIC PANEL: CPT

## 2023-12-21 PROCEDURE — 71271 CT THORAX LUNG CANCER SCR C-: CPT | Performed by: RADIOLOGY

## 2023-12-21 PROCEDURE — 71271 CT THORAX LUNG CANCER SCR C-: CPT

## 2023-12-21 PROCEDURE — 36415 COLL VENOUS BLD VENIPUNCTURE: CPT

## 2024-01-22 DIAGNOSIS — I10 PRIMARY HYPERTENSION: ICD-10-CM

## 2024-01-22 RX ORDER — LOSARTAN POTASSIUM 50 MG/1
50 TABLET ORAL DAILY
Qty: 90 TABLET | Refills: 3 | Status: SHIPPED | OUTPATIENT
Start: 2024-01-22

## 2024-06-20 NOTE — PROGRESS NOTES
CHIEF COMPLAINT  1 year follow up     HISTORY OF PRESENT ILLNESS    Cardiovascular hx:    1.Raynaud's disease:  -Current medical therapy includes sildenafil and cilostazol  -Patient reports Raynaud's has remained quite stable with no complications or ulcerations in the last year  -Patient continues to be aware of provoking factors and avoids colder temperatures when possible   -Patient reports med compliance     Cardiovascular testing:  Echo (January, 2022)-LVSF is normal with a 55-60% EF; grade II diastolic dysfunction   2. Carotid duplex (January, 2022)-<50% stenosis of the right and left proximal ICA; heterogenous plaque bilaterally       Past Medical, Surgical, and Family History reviewed and updated in chart.     Reviewed all medications by prescribing practitioner or clinical pharmacist (such as prescriptions, OTCs, herbal therapies and supplements) and documented in the medical record.    Past Medical History  History reviewed. No pertinent past medical history.    Social History  Social History     Tobacco Use    Smoking status: Former     Types: Cigarettes    Smokeless tobacco: Former   Vaping Use    Vaping status: Never Used   Substance Use Topics    Alcohol use: Not Currently    Drug use: Never       Family History     Family History   Problem Relation Name Age of Onset    Kidney cancer Mother          s/p nephrectomy and chemo    Coronary artery disease Father          multiple small MIs    Heart attack Father      Colon cancer Paternal Great-Grandmother          Allergies:  No Known Allergies     Outpatient Medications:  Current Outpatient Medications   Medication Instructions    aspirin 81 mg EC tablet 1 tablet, oral, Daily, As directed    atorvastatin (LIPITOR) 10 mg, oral, Nightly    carvedilol (COREG) 6.25 mg, oral, 2 times daily    cilostazol (PLETAL) 100 mg, oral, 2 times daily    losartan (COZAAR) 50 mg, oral, Daily, As directed    omeprazole (PRILOSEC) 40 mg, oral, Daily    prazosin  "(MINIPRESS) 1 mg, oral, 2 times daily    sildenafil (REVATIO) 20 mg, oral, 5 times daily, (2 tabs in morning/ 1 tab at noon / 2 tabs in evening)          Labs:   CMP:  Recent Labs     12/21/23  1230 07/06/23  1306 03/13/23  1247 05/10/21  1410 12/01/20  1230 10/09/19  1602    137 139  --  141 140   K 4.5 5.0 4.6  --  4.5 4.0    104 104  --  110* 109*   CO2 28 29 28  --  26 27   ANIONGAP 11 9* 12  --  10 8*   BUN 18 18 15 17 24* 21   CREATININE 1.34* 1.60* 1.36*  --  1.31* 1.27   EGFR 57*  --   --   --   --   --      Recent Labs     12/21/23  1230 03/13/23  1247 12/01/20  1230 10/09/19  1602   ALBUMIN 4.4 4.3 4.0 4.3   ALKPHOS 49 41 53 46   ALT 14 12 14 30   AST 13 12 13 19   BILITOT 0.6 0.8 0.5 0.7     CBC:  Recent Labs     03/13/23  1247   WBC 4.7   HGB 13.1*   HCT 41.2      MCV 93     COAG: No results for input(s): \"PTT\", \"INR\", \"HAUF\", \"DDIMERVTE\", \"HAPTOGLOBIN\", \"FIBRINOGEN\" in the last 04616 hours.  ABO: No results for input(s): \"ABO\" in the last 27543 hours.  HEME/ENDO:No results for input(s): \"FERRITIN\", \"IRONSAT\", \"TSH\", \"HGBA1C\" in the last 73886 hours.   CARDIAC: No results for input(s): \"LDH\", \"CKMB\", \"TROPHS\", \"BNP\" in the last 97812 hours.    No lab exists for component: \"CK\", \"CKMBP\"  Recent Labs     12/21/23  1230 12/01/20  1230 10/09/19  1602   CHOL 152 156 143   LDLF  --  98 85   HDL 48.0 43.0 45.0   TRIG 47 73 67     MICRO: No results for input(s): \"ESR\", \"CRP\", \"PROCAL\" in the last 93694 hours.  No results found for the last 90 days.    Notable Studies: imaging personally reviewed   EKG:No results found for this or any previous visit (from the past 4464 hour(s)).  Echocardiogram:   [place] [address]  Phone [phone], Fax [phone]    TRANSTHORACIC ECHOCARDIOGRAM REPORT      Patient Name:     ALFONSO MEJIA Reading Physician:   [id number] Ron Gonzales MD  Study Date:       1/3/2022         " Referring Physician: [id number] RUTH HINKLE  MRN/PID:          [id number]         PCP:  Accession/Order#: [id number]     Department Location: [facility]  YOB: 1950        Fellow:  Gender:           M                Nurse:  Admit Date:                        Sonographer:         EL Martinez RVT  Admission Status: Outpatient       Additional Staff:  Height:           170.18 cm        CC Report to:  Weight:           81.65 kg         Study Type:          Echocardiogram  BSA:              1.93 m2  Blood Pressure: 136 /74 mmHg    Diagnosis/ICD: R06.02-Shortness of breath  Indication:    Dyspnea on Exertion  Procedure/CPT: Echo Complete w Full Doppler-[id number]    Patient History:  Pertinent History: No previous echo.    Study Detail: The following Echo studies were performed: 2D, M-Mode, Doppler and  color flow. A bubble study was not performed. The patient was  awake.      PHYSICIAN INTERPRETATION:  Left Ventricle: The left ventricular systolic function is normal, with an estimated ejection fraction of 55-60%. There are no regional wall motion abnormalities. The left ventricular cavity size is normal. Spectral Doppler shows a pseudonormal pattern of left ventricular diastolic filling.  Left Atrium: The left atrium is normal in size.  Right Ventricle: The right ventricle is normal in size. There is normal right ventricular global systolic function.  Right Atrium: The right atrium is normal in size.  Aortic Valve: The aortic valve was not well visualized. There is no evidence of aortic valve regurgitation. The peak instantaneous gradient of the aortic valve is 6.4 mmHg. The mean gradient of the aortic valve is 4.0 mmHg. Calcified aortic valve leaflets with normal opening in systole. Number of leaflets could not be adequately delineated.  Mitral Valve: The mitral valve is normal in structure. There is no evidence of mitral valve regurgitation. Calcified mitral annulus and  leaflets.  Tricuspid Valve: The tricuspid valve is structurally normal. No evidence of tricuspid regurgitation.  Pulmonic Valve: The pulmonic valve is not well visualized. There is no indication of pulmonic valve regurgitation.  Pericardium: There is no pericardial effusion noted.  Aorta: The aortic root is normal.  Systemic Veins: The inferior vena cava appears to be of normal size. There is IVC inspiratory collapse greater than 50%.      CONCLUSIONS:  1. The left ventricular systolic function is normal with a 55-60% estimated ejection fraction.  2. Spectral Doppler shows a pseudonormal pattern of left ventricular diastolic filling.    QUANTITATIVE DATA SUMMARY:  2D MEASUREMENTS:  Normal Ranges:  Ao Root d:     3.30 cm   (2.0-3.7cm)  LAs:           3.10 cm   (2.7-4.0cm)  IVSd:          1.11 cm   (0.6-1.1cm)  LVPWd:         1.15 cm   (0.6-1.1cm)  LVIDd:         4.23 cm   (3.9-5.9cm)  LVIDs:         2.78 cm  LV Mass Index: 85.4 g/m2  LV % FS        34.3 %    LA VOLUME:  Normal Ranges:  LA Vol A4C:        33.9 ml    (22+/-6mL/m2)  LA Vol A2C:        42.1 ml  LA Vol BP:         44.7 ml  LA Vol Index A4C:  17.5ml/m2  LA Vol Index A2C:  21.8 ml/m2  LA Vol Index BP:   23.1 ml/m2  LA Area A4C:       15.3 cm2  LA Area A2C:       14.4 cm2  LA Major Axis A4C: 5.9 cm  LA Major Axis A2C: 4.2 cm  LA Volume Index:   20.3 ml/m2  LA Vol A4C:        35.2 ml  LA Vol A2C:        39.1 ml    M-MODE MEASUREMENTS:  Normal Ranges:  AoV Exc: 1.40 cm (1.5-2.5cm)    AORTA MEASUREMENTS:  Normal Ranges:  AoV Exc: 1.40 cm (1.5-2.5cm)    LV SYSTOLIC FUNCTION BY 2D PLANIMETRY (MOD):  Normal Ranges:  EF-A4C View: 50.2 % (>55%)  EF-A2C View: 67.5 %  EF-Biplane:  57.3 %    LV DIASTOLIC FUNCTION:  Normal Ranges:  MV Peak E:    0.66 m/s (0.7-1.2 m/s)  MV Peak A:    0.66 m/s (0.42-0.7 m/s)  E/A Ratio:    1.00     (1.0-2.2)  MV e'         0.06 m/s (>8.0)  MV lateral e' 0.09 m/s  MV medial e'  0.06 m/s  E/e' Ratio:   11.08    (<8.0)    MITRAL  VALVE:  Normal Ranges:  MV DT: 306 msec (150-240msec)    AORTIC VALVE:  Normal Ranges:  AoV Vmax:                1.26 m/s (<1.7m/s)  AoV Peak P.4 mmHg (<20mmHg)  AoV Mean P.0 mmHg (1.7-11.5mmHg)  LVOT Max Farshad:            0.85 m/s (<1.1m/s)  AoV VTI:                 26.50 cm (18-25cm)  LVOT VTI:                18.30 cm  LVOT Diameter:           2.10 cm  (1.8-2.4cm)  AoV Area, VTI:           2.39 cm2 (2.5-5.5cm2)  AoV Area,Vmax:           2.34 cm2 (2.5-4.5cm2)  AoV Dimensionless Index: 0.69    RIGHT VENTRICLE:  RV 1   3.40 cm  RV 2   2.17 cm  RV 3   5.86 cm  TAPSE: 18.4 mm    PULMONIC VALVE:  Normal Ranges:  PV Accel Time: 120 msec (>120ms)  PV Max Farshad:    1.0 m/s  (0.6-0.9m/s)  PV Max PG:     3.8 mmHg      [id number] Ron Gonzales MD  Electronically signed on 1/3/2022 at 12:58:42 PM            Stress Testing: No results found for this or any previous visit from the past 1825 days.    Cardiac Catheterization: No results found for this or any previous visit from the past 1825 days.  No results found for this or any previous visit from the past 3650 days.         REVIEW OF SYSTEMS  A 10-point system review was completed and was negative except as noted in the HPI.      VITALS  Vitals:    24 1302   BP: 122/72   Pulse: 62   SpO2: 97%       PHYSICAL EXAM  General: awake, alert and oriented. No acute distress.   Skin: Skin is warm, dry and intact without rashes or lesions.  HEENT: normocephalic, atraumatic; conjunctivae are clear without exudates or hemorrhage. Sclera is non-icteric. Eyelids are normal in appearance without swelling or lesions. Hearing intact. Nares are patent bilaterally. Moist mucous membranes.   Cardiovascular: heart rate and rhythm are normal. No murmurs, gallops, or rubs are auscultated. S1 and S2 are heard and are of normal intensity. No JVD, no carotid bruits  Respiratory: bilateral lung sounds clear to auscultations without rales, rhonchi, or wheezes. No accessory  muscle use or stridor  Gastrointestinal: non-distended, non-tender  Genitourinary: exam deferred  Musculoskeletal: ROM intact, no deformities  Extremities: pulses palpable bilaterally; no swelling or erythema; no wounds; good cap refill bilaterally x 4   Neurological: no focal deficits; gait steady  Psychiatric: appropriate mood and affect; good judgment and insight          ASSESSMENT AND PLAN  Assessment/Plan   Diagnoses and all orders for this visit:  Raynaud's disease without gangrene  -Stable; no wounds or ulcerations  -Continue to avoid provoking factors and extreme cold temperatures; discussed non-pharmacological interventions such as warm socks and gloves when in colder temperatures      RTC: 1 year      Thank you for allowing me to participate in the care of this patient. Please reach me out if you have any questions or if you need any clarifications regarding the patient's care.    Gertrudis Sanabria DNP, APRN, FNP-C  Division of Cardiovascular Medicine  [facility]  [facility]

## 2024-06-24 ENCOUNTER — APPOINTMENT (OUTPATIENT)
Dept: PRIMARY CARE | Facility: CLINIC | Age: 74
End: 2024-06-24
Payer: MEDICARE

## 2024-06-24 ENCOUNTER — APPOINTMENT (OUTPATIENT)
Dept: CARDIOLOGY | Facility: CLINIC | Age: 74
End: 2024-06-24
Payer: MEDICARE

## 2024-06-24 VITALS
HEART RATE: 71 BPM | HEIGHT: 65 IN | WEIGHT: 176 LBS | DIASTOLIC BLOOD PRESSURE: 72 MMHG | SYSTOLIC BLOOD PRESSURE: 123 MMHG | BODY MASS INDEX: 29.32 KG/M2

## 2024-06-24 VITALS
DIASTOLIC BLOOD PRESSURE: 72 MMHG | SYSTOLIC BLOOD PRESSURE: 122 MMHG | BODY MASS INDEX: 29.31 KG/M2 | OXYGEN SATURATION: 97 % | WEIGHT: 175.9 LBS | HEART RATE: 62 BPM | HEIGHT: 65 IN

## 2024-06-24 DIAGNOSIS — E78.2 MIXED HYPERLIPIDEMIA: ICD-10-CM

## 2024-06-24 DIAGNOSIS — K21.9 GASTROESOPHAGEAL REFLUX DISEASE WITHOUT ESOPHAGITIS: ICD-10-CM

## 2024-06-24 DIAGNOSIS — R09.89 CAROTID BRUIT, UNSPECIFIED LATERALITY: ICD-10-CM

## 2024-06-24 DIAGNOSIS — I73.00 RAYNAUD'S DISEASE WITHOUT GANGRENE: Primary | ICD-10-CM

## 2024-06-24 DIAGNOSIS — N40.1 BENIGN PROSTATIC HYPERPLASIA WITH URINARY OBSTRUCTION AND OTHER LOWER URINARY TRACT SYMPTOMS: ICD-10-CM

## 2024-06-24 DIAGNOSIS — N13.8 BENIGN PROSTATIC HYPERPLASIA WITH URINARY OBSTRUCTION AND OTHER LOWER URINARY TRACT SYMPTOMS: ICD-10-CM

## 2024-06-24 DIAGNOSIS — I10 PRIMARY HYPERTENSION: ICD-10-CM

## 2024-06-24 PROCEDURE — 93000 ELECTROCARDIOGRAM COMPLETE: CPT

## 2024-06-24 PROCEDURE — 99214 OFFICE O/P EST MOD 30 MIN: CPT

## 2024-06-24 PROCEDURE — 99214 OFFICE O/P EST MOD 30 MIN: CPT | Performed by: INTERNAL MEDICINE

## 2024-06-24 PROCEDURE — 1036F TOBACCO NON-USER: CPT | Performed by: INTERNAL MEDICINE

## 2024-06-24 PROCEDURE — 3074F SYST BP LT 130 MM HG: CPT | Performed by: INTERNAL MEDICINE

## 2024-06-24 PROCEDURE — 1160F RVW MEDS BY RX/DR IN RCRD: CPT

## 2024-06-24 PROCEDURE — 3078F DIAST BP <80 MM HG: CPT | Performed by: INTERNAL MEDICINE

## 2024-06-24 PROCEDURE — 1160F RVW MEDS BY RX/DR IN RCRD: CPT | Performed by: INTERNAL MEDICINE

## 2024-06-24 PROCEDURE — 1036F TOBACCO NON-USER: CPT

## 2024-06-24 PROCEDURE — 1159F MED LIST DOCD IN RCRD: CPT | Performed by: INTERNAL MEDICINE

## 2024-06-24 PROCEDURE — 3074F SYST BP LT 130 MM HG: CPT

## 2024-06-24 PROCEDURE — 1159F MED LIST DOCD IN RCRD: CPT

## 2024-06-24 PROCEDURE — 3078F DIAST BP <80 MM HG: CPT

## 2024-06-24 RX ORDER — CARVEDILOL 6.25 MG/1
6.25 TABLET ORAL 2 TIMES DAILY
Qty: 90 TABLET | Refills: 3 | Status: SHIPPED | OUTPATIENT
Start: 2024-06-24

## 2024-06-24 RX ORDER — PRAZOSIN HYDROCHLORIDE 1 MG/1
1 CAPSULE ORAL 2 TIMES DAILY
Qty: 180 CAPSULE | Refills: 3 | Status: SHIPPED | OUTPATIENT
Start: 2024-06-24

## 2024-06-24 RX ORDER — ATORVASTATIN CALCIUM 10 MG/1
10 TABLET, FILM COATED ORAL NIGHTLY
Qty: 90 TABLET | Refills: 3 | Status: SHIPPED | OUTPATIENT
Start: 2024-06-24

## 2024-06-24 RX ORDER — CILOSTAZOL 100 MG/1
100 TABLET ORAL 2 TIMES DAILY
Qty: 180 TABLET | Refills: 3 | Status: SHIPPED | OUTPATIENT
Start: 2024-06-24

## 2024-06-24 RX ORDER — OMEPRAZOLE 40 MG/1
40 CAPSULE, DELAYED RELEASE ORAL DAILY
Qty: 90 CAPSULE | Refills: 3 | Status: SHIPPED | OUTPATIENT
Start: 2024-06-24

## 2024-06-24 ASSESSMENT — PATIENT HEALTH QUESTIONNAIRE - PHQ9
2. FEELING DOWN, DEPRESSED OR HOPELESS: NOT AT ALL
SUM OF ALL RESPONSES TO PHQ9 QUESTIONS 1 AND 2: 0
1. LITTLE INTEREST OR PLEASURE IN DOING THINGS: NOT AT ALL

## 2024-06-24 ASSESSMENT — ENCOUNTER SYMPTOMS
ABDOMINAL PAIN: 0
EYE DISCHARGE: 0
APPETITE CHANGE: 0
DYSURIA: 0
ABDOMINAL DISTENTION: 0
ACTIVITY CHANGE: 0
CONSTIPATION: 0
SORE THROAT: 0
RHINORRHEA: 0
NERVOUS/ANXIOUS: 0
VOMITING: 0
HEADACHES: 0
DIZZINESS: 0
BACK PAIN: 0
UNEXPECTED WEIGHT CHANGE: 0
FATIGUE: 0
NECK PAIN: 0
BLOOD IN STOOL: 0
TROUBLE SWALLOWING: 0
COUGH: 0
HALLUCINATIONS: 0
NAUSEA: 0
FREQUENCY: 0
SINUS PRESSURE: 0
ARTHRALGIAS: 0
FEVER: 0
DIARRHEA: 0
SHORTNESS OF BREATH: 0
NUMBNESS: 0
WEAKNESS: 0

## 2024-06-24 NOTE — PROGRESS NOTES
"Subjective   Patient ID: Yohan Mccain is a 73 y.o. male who presents for Follow-up (6 month).  HPI  Patient Is here today for 6 mo follow up     Patihilary rpeorts that he is doing well, he enjoys the hot weather.     Review of Systems   Constitutional:  Negative for activity change, appetite change, fatigue, fever and unexpected weight change.   HENT:  Negative for congestion, ear discharge, ear pain, nosebleeds, postnasal drip, rhinorrhea, sinus pressure, sneezing, sore throat, tinnitus and trouble swallowing.    Eyes:  Negative for discharge.   Respiratory:  Negative for cough and shortness of breath.    Cardiovascular:  Negative for chest pain.   Gastrointestinal:  Negative for abdominal distention, abdominal pain, blood in stool, constipation, diarrhea, nausea and vomiting.   Endocrine: Negative for cold intolerance.   Genitourinary:  Negative for dysuria and frequency.   Musculoskeletal:  Negative for arthralgias, back pain and neck pain.   Skin:  Negative for rash.   Neurological:  Negative for dizziness, weakness, numbness and headaches.   Psychiatric/Behavioral:  Negative for hallucinations. The patient is not nervous/anxious.        Objective   /72   Pulse 71   Ht 1.651 m (5' 5\")   Wt 79.8 kg (176 lb)   BMI 29.29 kg/m²     Physical Exam  Constitutional:       General: He is not in acute distress.     Appearance: Normal appearance.   HENT:      Head: Normocephalic.      Nose: Nose normal.      Mouth/Throat:      Pharynx: No oropharyngeal exudate.   Eyes:      General:         Right eye: No discharge.         Left eye: No discharge.      Extraocular Movements: Extraocular movements intact.      Pupils: Pupils are equal, round, and reactive to light.   Cardiovascular:      Rate and Rhythm: Normal rate and regular rhythm.      Heart sounds: No murmur heard.     No gallop.   Pulmonary:      Effort: Pulmonary effort is normal. No respiratory distress.      Breath sounds: Normal breath sounds. No " wheezing.   Musculoskeletal:         General: No swelling. Normal range of motion.   Skin:     General: Skin is warm and dry.      Coloration: Skin is not jaundiced.   Neurological:      General: No focal deficit present.      Mental Status: He is alert and oriented to person, place, and time.      Cranial Nerves: No cranial nerve deficit.   Psychiatric:         Mood and Affect: Mood normal.         Behavior: Behavior normal.           Assessment/Plan   Problem List Items Addressed This Visit       Benign prostatic hyperplasia with urinary obstruction and other lower urinary tract symptoms    Carotid bruit    GERD (gastroesophageal reflux disease)    HTN (hypertension)    Hyperlipidemia    HTN, controlled   - continue losartan 50mg po daily      2. Raynauds, severe, stable   - following with vascular   - on revatio 20mg po 5x a day   - on pletal 100mg po bid   - on coreg 6.25mg po daily   - on asa 81mg po daily      3.  HLD   - will order lipid panel   - on lipitor 10mg po daily      4. Follows with Dr King, Psa ordered     Immunizations   Flu shot 2022, did not get this year   COVID received   Pna received   Shingles received   RSV recommended      PSA 2023, ordered pending by dr King   Colon caner screening 2021   Final diagnoses:   [E78.2] Mixed hyperlipidemia   [I10] Primary hypertension   [N40.1, N13.8] Benign prostatic hyperplasia with urinary obstruction and other lower urinary tract symptoms   [R09.89] Carotid bruit, unspecified laterality   [K21.9] Gastroesophageal reflux disease without esophagitis

## 2024-09-03 DIAGNOSIS — E78.2 MIXED HYPERLIPIDEMIA: ICD-10-CM

## 2024-09-03 RX ORDER — ATORVASTATIN CALCIUM 10 MG/1
10 TABLET, FILM COATED ORAL NIGHTLY
Qty: 90 TABLET | Refills: 3 | Status: SHIPPED | OUTPATIENT
Start: 2024-09-03

## 2024-09-03 RX ORDER — ATORVASTATIN CALCIUM 10 MG/1
10 TABLET, FILM COATED ORAL NIGHTLY
Qty: 90 TABLET | Refills: 3 | Status: SHIPPED | OUTPATIENT
Start: 2024-09-03 | End: 2024-09-03 | Stop reason: SDUPTHER

## 2024-11-18 DIAGNOSIS — I73.00 RAYNAUD'S DISEASE WITHOUT GANGRENE: ICD-10-CM

## 2024-11-18 RX ORDER — SILDENAFIL CITRATE 20 MG/1
20 TABLET ORAL
Qty: 150 TABLET | Refills: 11 | Status: SHIPPED | OUTPATIENT
Start: 2024-11-18

## 2024-11-21 ENCOUNTER — TELEPHONE (OUTPATIENT)
Dept: RADIOLOGY | Facility: HOSPITAL | Age: 74
End: 2024-11-21
Payer: MEDICARE

## 2024-11-21 NOTE — TELEPHONE ENCOUNTER
Epic message sent to the patient provider with regard to an order pended in her in box, and need to cosign the order.

## 2024-12-02 ENCOUNTER — LAB (OUTPATIENT)
Dept: LAB | Facility: LAB | Age: 74
End: 2024-12-02
Payer: MEDICARE

## 2024-12-02 DIAGNOSIS — R35.1 NOCTURIA: ICD-10-CM

## 2024-12-02 DIAGNOSIS — I10 PRIMARY HYPERTENSION: ICD-10-CM

## 2024-12-02 LAB
ALBUMIN SERPL BCP-MCNC: 3.9 G/DL (ref 3.4–5)
ALP SERPL-CCNC: 46 U/L (ref 33–136)
ALT SERPL W P-5'-P-CCNC: 10 U/L (ref 10–52)
ANION GAP SERPL CALC-SCNC: 9 MMOL/L (ref 10–20)
AST SERPL W P-5'-P-CCNC: 9 U/L (ref 9–39)
BILIRUB SERPL-MCNC: 0.6 MG/DL (ref 0–1.2)
BUN SERPL-MCNC: 21 MG/DL (ref 6–23)
CALCIUM SERPL-MCNC: 9.1 MG/DL (ref 8.6–10.3)
CHLORIDE SERPL-SCNC: 108 MMOL/L (ref 98–107)
CHOLEST SERPL-MCNC: 159 MG/DL (ref 0–199)
CHOLESTEROL/HDL RATIO: 3.5
CO2 SERPL-SCNC: 30 MMOL/L (ref 21–32)
CREAT SERPL-MCNC: 1.32 MG/DL (ref 0.5–1.3)
EGFRCR SERPLBLD CKD-EPI 2021: 58 ML/MIN/1.73M*2
GLUCOSE SERPL-MCNC: 104 MG/DL (ref 74–99)
HDLC SERPL-MCNC: 45 MG/DL
LDLC SERPL CALC-MCNC: 103 MG/DL
NON HDL CHOLESTEROL: 114 MG/DL (ref 0–149)
POTASSIUM SERPL-SCNC: 4.2 MMOL/L (ref 3.5–5.3)
PROT SERPL-MCNC: 5.8 G/DL (ref 6.4–8.2)
PSA SERPL-MCNC: 1.36 NG/ML
SODIUM SERPL-SCNC: 143 MMOL/L (ref 136–145)
TRIGL SERPL-MCNC: 57 MG/DL (ref 0–149)
TSH SERPL-ACNC: 0.92 MIU/L (ref 0.44–3.98)
VLDL: 11 MG/DL (ref 0–40)

## 2024-12-02 PROCEDURE — 84443 ASSAY THYROID STIM HORMONE: CPT

## 2024-12-02 PROCEDURE — 80061 LIPID PANEL: CPT

## 2024-12-02 PROCEDURE — 80053 COMPREHEN METABOLIC PANEL: CPT

## 2024-12-02 PROCEDURE — 84153 ASSAY OF PSA TOTAL: CPT

## 2024-12-02 PROCEDURE — 36415 COLL VENOUS BLD VENIPUNCTURE: CPT

## 2024-12-10 ASSESSMENT — ENCOUNTER SYMPTOMS
NAUSEA: 0
SHORTNESS OF BREATH: 0
ALLERGIC/IMMUNOLOGIC NEGATIVE: 1
PSYCHIATRIC NEGATIVE: 1
DIFFICULTY URINATING: 0
COUGH: 0
ENDOCRINE NEGATIVE: 1
FEVER: 0
CHILLS: 0
EYES NEGATIVE: 1

## 2024-12-10 NOTE — PROGRESS NOTES
Subjective   Patient ID: Yohan Mccain is a 74 y.o. male.    HPI  Patient is here for yearly f/u. Most recent PSA was 1.36 on 12/24. Prior PSA was  1.07 on 12/23. Prior PSA was 0.91 on 12/22. Prior PSA was 0.67 on 11/2021. Prior PSA was 0.72 on 12/2020. Chronic BPH sx are mild and stable. Some urgency and frequency. Denies dysuria. Denies hematuria. Nocturia x1 which is actually new. No medications for the prostate. ED is mild. Patient is taking Sildenafil for Raynaud's disease.          Review of Systems   Constitutional:  Negative for chills and fever.   HENT: Negative.     Eyes: Negative.    Respiratory:  Negative for cough and shortness of breath.    Cardiovascular:  Negative for chest pain and leg swelling.   Gastrointestinal:  Negative for nausea.   Endocrine: Negative.    Genitourinary:  Negative for difficulty urinating.        Negative except for documented in HPI   Allergic/Immunologic: Negative.    Neurological:         Alert & oriented X 3   Hematological:         Denies blood thinners   Psychiatric/Behavioral: Negative.         Objective   Physical Exam  Vitals and nursing note reviewed.   Constitutional:       General: He is not in acute distress.     Appearance: Normal appearance.   Pulmonary:      Effort: Pulmonary effort is normal.   Abdominal:      Tenderness: There is no abdominal tenderness.   Genitourinary:     Comments: Kidneys non palpable bilaterally  Bladder non palpable or tender  Scrotum no mass, No hydrocele  Epididymis- No spermatocele. Non Tender.  Testicles: No mass. WNL  Urethra: No discharge  Penis within normal limits... No lesions. circumcised  Prostate - symmetric, no nodules. SMooth  Seminal Vesicals: No mass.  Sphincter tone: normal  Neurological:      Mental Status: He is alert.         Assessment/Plan       Diagnoses and all orders for this visit:  Benign prostatic hyperplasia with urinary obstruction and other lower urinary tract symptoms  Erectile dysfunction, unspecified  erectile dysfunction type  Nocturia      All available PSA values reviewed, Options discussed. Questions answered.   Diet changes for prostate health discussed and educational information given. Pros/Cons of prostate health supplements discussed.   Treatment options for LUTS reviewed  Discussed timed voiding. Discussed fluid and caffeine intake  Treatment options for ED reviewed.  Has Sildenafil gor other medical issues  Lifestyle change to help prevent UTIs discussed. Encouraged fluid intake.  UA ordered  BMP reviewed  CT 2023 reviewed-No urologic issues    F/U 1 year with PSA and UA

## 2024-12-11 ENCOUNTER — APPOINTMENT (OUTPATIENT)
Dept: UROLOGY | Facility: CLINIC | Age: 74
End: 2024-12-11
Payer: MEDICARE

## 2024-12-11 VITALS
HEART RATE: 62 BPM | SYSTOLIC BLOOD PRESSURE: 152 MMHG | BODY MASS INDEX: 29.79 KG/M2 | WEIGHT: 179 LBS | DIASTOLIC BLOOD PRESSURE: 77 MMHG

## 2024-12-11 DIAGNOSIS — N13.8 BENIGN PROSTATIC HYPERPLASIA WITH URINARY OBSTRUCTION AND OTHER LOWER URINARY TRACT SYMPTOMS: ICD-10-CM

## 2024-12-11 DIAGNOSIS — N52.9 ERECTILE DYSFUNCTION, UNSPECIFIED ERECTILE DYSFUNCTION TYPE: ICD-10-CM

## 2024-12-11 DIAGNOSIS — N40.1 BENIGN PROSTATIC HYPERPLASIA WITH URINARY OBSTRUCTION AND OTHER LOWER URINARY TRACT SYMPTOMS: ICD-10-CM

## 2024-12-11 DIAGNOSIS — R35.1 NOCTURIA: ICD-10-CM

## 2024-12-11 PROCEDURE — 1036F TOBACCO NON-USER: CPT | Performed by: UROLOGY

## 2024-12-11 PROCEDURE — 99214 OFFICE O/P EST MOD 30 MIN: CPT | Performed by: UROLOGY

## 2024-12-11 PROCEDURE — 3077F SYST BP >= 140 MM HG: CPT | Performed by: UROLOGY

## 2024-12-11 PROCEDURE — 3078F DIAST BP <80 MM HG: CPT | Performed by: UROLOGY

## 2024-12-11 PROCEDURE — 1159F MED LIST DOCD IN RCRD: CPT | Performed by: UROLOGY

## 2024-12-23 ENCOUNTER — HOSPITAL ENCOUNTER (OUTPATIENT)
Dept: RADIOLOGY | Facility: HOSPITAL | Age: 74
Discharge: HOME | End: 2024-12-23
Payer: MEDICARE

## 2024-12-23 DIAGNOSIS — F17.211 NICOTINE DEPENDENCE, CIGARETTES, IN REMISSION: ICD-10-CM

## 2024-12-23 PROCEDURE — 71271 CT THORAX LUNG CANCER SCR C-: CPT

## 2024-12-23 PROCEDURE — 71271 CT THORAX LUNG CANCER SCR C-: CPT | Performed by: RADIOLOGY

## 2024-12-30 ENCOUNTER — APPOINTMENT (OUTPATIENT)
Dept: PRIMARY CARE | Facility: CLINIC | Age: 74
End: 2024-12-30
Payer: MEDICARE

## 2024-12-30 VITALS
HEART RATE: 87 BPM | BODY MASS INDEX: 29.66 KG/M2 | WEIGHT: 178 LBS | HEIGHT: 65 IN | SYSTOLIC BLOOD PRESSURE: 135 MMHG | DIASTOLIC BLOOD PRESSURE: 78 MMHG

## 2024-12-30 DIAGNOSIS — I10 PRIMARY HYPERTENSION: ICD-10-CM

## 2024-12-30 DIAGNOSIS — N40.1 BENIGN PROSTATIC HYPERPLASIA WITH URINARY OBSTRUCTION AND OTHER LOWER URINARY TRACT SYMPTOMS: ICD-10-CM

## 2024-12-30 DIAGNOSIS — N18.30 BENIGN HYPERTENSION WITH CKD (CHRONIC KIDNEY DISEASE) STAGE III (MULTI): ICD-10-CM

## 2024-12-30 DIAGNOSIS — I12.9 BENIGN HYPERTENSION WITH CKD (CHRONIC KIDNEY DISEASE) STAGE III (MULTI): ICD-10-CM

## 2024-12-30 DIAGNOSIS — K21.9 GASTROESOPHAGEAL REFLUX DISEASE WITHOUT ESOPHAGITIS: ICD-10-CM

## 2024-12-30 DIAGNOSIS — N13.8 BENIGN PROSTATIC HYPERPLASIA WITH URINARY OBSTRUCTION AND OTHER LOWER URINARY TRACT SYMPTOMS: ICD-10-CM

## 2024-12-30 DIAGNOSIS — Z00.00 MEDICARE ANNUAL WELLNESS VISIT, SUBSEQUENT: ICD-10-CM

## 2024-12-30 DIAGNOSIS — E78.2 MIXED HYPERLIPIDEMIA: ICD-10-CM

## 2024-12-30 DIAGNOSIS — I73.00 RAYNAUD'S DISEASE WITHOUT GANGRENE: Primary | ICD-10-CM

## 2024-12-30 PROCEDURE — 1160F RVW MEDS BY RX/DR IN RCRD: CPT | Performed by: INTERNAL MEDICINE

## 2024-12-30 PROCEDURE — 99213 OFFICE O/P EST LOW 20 MIN: CPT | Performed by: INTERNAL MEDICINE

## 2024-12-30 PROCEDURE — 3078F DIAST BP <80 MM HG: CPT | Performed by: INTERNAL MEDICINE

## 2024-12-30 PROCEDURE — 3075F SYST BP GE 130 - 139MM HG: CPT | Performed by: INTERNAL MEDICINE

## 2024-12-30 PROCEDURE — 1159F MED LIST DOCD IN RCRD: CPT | Performed by: INTERNAL MEDICINE

## 2024-12-30 PROCEDURE — G0439 PPPS, SUBSEQ VISIT: HCPCS | Performed by: INTERNAL MEDICINE

## 2024-12-30 PROCEDURE — 3008F BODY MASS INDEX DOCD: CPT | Performed by: INTERNAL MEDICINE

## 2024-12-30 PROCEDURE — 1036F TOBACCO NON-USER: CPT | Performed by: INTERNAL MEDICINE

## 2024-12-30 RX ORDER — LOSARTAN POTASSIUM 50 MG/1
50 TABLET ORAL DAILY
Qty: 90 TABLET | Refills: 3 | Status: SHIPPED | OUTPATIENT
Start: 2024-12-30

## 2024-12-30 RX ORDER — CARVEDILOL 6.25 MG/1
6.25 TABLET ORAL 2 TIMES DAILY
Qty: 90 TABLET | Refills: 3 | Status: SHIPPED | OUTPATIENT
Start: 2024-12-30

## 2025-02-18 DIAGNOSIS — I10 PRIMARY HYPERTENSION: ICD-10-CM

## 2025-02-18 RX ORDER — CARVEDILOL 6.25 MG/1
6.25 TABLET ORAL 2 TIMES DAILY
Qty: 180 TABLET | Refills: 3 | Status: SHIPPED | OUTPATIENT
Start: 2025-02-18

## 2025-04-24 ENCOUNTER — APPOINTMENT (OUTPATIENT)
Age: 75
End: 2025-04-24
Payer: MEDICARE

## 2025-04-24 VITALS
HEIGHT: 65 IN | BODY MASS INDEX: 29.32 KG/M2 | DIASTOLIC BLOOD PRESSURE: 70 MMHG | HEART RATE: 83 BPM | OXYGEN SATURATION: 98 % | WEIGHT: 176 LBS | SYSTOLIC BLOOD PRESSURE: 128 MMHG

## 2025-04-24 DIAGNOSIS — N18.30 BENIGN HYPERTENSION WITH CKD (CHRONIC KIDNEY DISEASE) STAGE III (MULTI): ICD-10-CM

## 2025-04-24 DIAGNOSIS — Z12.5 SCREENING PSA (PROSTATE SPECIFIC ANTIGEN): ICD-10-CM

## 2025-04-24 DIAGNOSIS — J44.9 CHRONIC OBSTRUCTIVE PULMONARY DISEASE, UNSPECIFIED COPD TYPE (MULTI): ICD-10-CM

## 2025-04-24 DIAGNOSIS — N40.1 BENIGN PROSTATIC HYPERPLASIA WITH URINARY OBSTRUCTION AND OTHER LOWER URINARY TRACT SYMPTOMS: ICD-10-CM

## 2025-04-24 DIAGNOSIS — I12.9 BENIGN HYPERTENSION WITH CKD (CHRONIC KIDNEY DISEASE) STAGE III (MULTI): ICD-10-CM

## 2025-04-24 DIAGNOSIS — R09.89 CAROTID BRUIT, UNSPECIFIED LATERALITY: ICD-10-CM

## 2025-04-24 DIAGNOSIS — R73.09 ELEVATED GLUCOSE: ICD-10-CM

## 2025-04-24 DIAGNOSIS — E78.2 MIXED HYPERLIPIDEMIA: ICD-10-CM

## 2025-04-24 DIAGNOSIS — K21.9 GASTROESOPHAGEAL REFLUX DISEASE WITHOUT ESOPHAGITIS: ICD-10-CM

## 2025-04-24 DIAGNOSIS — N13.8 BENIGN PROSTATIC HYPERPLASIA WITH URINARY OBSTRUCTION AND OTHER LOWER URINARY TRACT SYMPTOMS: ICD-10-CM

## 2025-04-24 DIAGNOSIS — I73.00 RAYNAUD'S DISEASE WITHOUT GANGRENE: ICD-10-CM

## 2025-04-24 DIAGNOSIS — I10 PRIMARY HYPERTENSION: Primary | ICD-10-CM

## 2025-04-24 PROBLEM — R14.0 ABDOMINAL DISTENSION: Status: RESOLVED | Noted: 2023-03-02 | Resolved: 2025-04-24

## 2025-04-24 PROBLEM — R19.8 CHANGE IN BOWEL MOVEMENT: Status: RESOLVED | Noted: 2023-03-02 | Resolved: 2025-04-24

## 2025-04-24 PROBLEM — R10.9 ABDOMINAL PAIN: Status: RESOLVED | Noted: 2023-03-02 | Resolved: 2025-04-24

## 2025-04-24 PROCEDURE — 3078F DIAST BP <80 MM HG: CPT | Performed by: FAMILY MEDICINE

## 2025-04-24 PROCEDURE — 1160F RVW MEDS BY RX/DR IN RCRD: CPT | Performed by: FAMILY MEDICINE

## 2025-04-24 PROCEDURE — 1036F TOBACCO NON-USER: CPT | Performed by: FAMILY MEDICINE

## 2025-04-24 PROCEDURE — 3074F SYST BP LT 130 MM HG: CPT | Performed by: FAMILY MEDICINE

## 2025-04-24 PROCEDURE — 3008F BODY MASS INDEX DOCD: CPT | Performed by: FAMILY MEDICINE

## 2025-04-24 PROCEDURE — 99214 OFFICE O/P EST MOD 30 MIN: CPT | Performed by: FAMILY MEDICINE

## 2025-04-24 PROCEDURE — G2211 COMPLEX E/M VISIT ADD ON: HCPCS | Performed by: FAMILY MEDICINE

## 2025-04-24 PROCEDURE — 1159F MED LIST DOCD IN RCRD: CPT | Performed by: FAMILY MEDICINE

## 2025-04-24 RX ORDER — OMEPRAZOLE 40 MG/1
40 CAPSULE, DELAYED RELEASE ORAL DAILY
Qty: 90 CAPSULE | Refills: 3 | Status: SHIPPED | OUTPATIENT
Start: 2025-04-24

## 2025-04-24 RX ORDER — LOSARTAN POTASSIUM 50 MG/1
50 TABLET ORAL DAILY
Qty: 90 TABLET | Refills: 3 | Status: SHIPPED | OUTPATIENT
Start: 2025-04-24

## 2025-04-24 RX ORDER — CILOSTAZOL 100 MG/1
100 TABLET ORAL 2 TIMES DAILY
Qty: 180 TABLET | Refills: 3 | Status: SHIPPED | OUTPATIENT
Start: 2025-04-24

## 2025-04-24 RX ORDER — PRAZOSIN HYDROCHLORIDE 1 MG/1
1 CAPSULE ORAL 2 TIMES DAILY
Qty: 180 CAPSULE | Refills: 3 | Status: SHIPPED | OUTPATIENT
Start: 2025-04-24

## 2025-04-24 RX ORDER — ATORVASTATIN CALCIUM 10 MG/1
10 TABLET, FILM COATED ORAL NIGHTLY
Qty: 90 TABLET | Refills: 3 | Status: SHIPPED | OUTPATIENT
Start: 2025-04-24

## 2025-04-24 NOTE — PROGRESS NOTES
Subjective   Patient ID: Yohan Mccain is a 74 y.o. male who presents for Establish Care.  HPI    Review of Systems    Objective   Physical Exam    Assessment/Plan            Liliana Don MA 04/24/25 8:53 AM

## 2025-04-24 NOTE — PATIENT INSTRUCTIONS
Continue current medications.  Follow up with specialists as scheduled.  Follow up in December, sooner if needed.

## 2025-05-15 NOTE — PROGRESS NOTES
Chief Complaint: Medicare Wellness Exam/Comprehensive Problem Focused Follow Up and Physical Exam    HPI:  Patient is here today for MWV.     Pt reports that he is doing ok.       Active Problem List  Patient Active Problem List   Diagnosis    Abdominal distension    Abdominal pain    Benign hypertension with CKD (chronic kidney disease) stage III (Multi)    Benign prostatic hyperplasia with urinary obstruction and other lower urinary tract symptoms    Carotid bruit    Change in bowel movement    Constipation    Erectile dysfunction    GERD (gastroesophageal reflux disease)    HTN (hypertension)    Hyperlipidemia    Lumbar strain    Nocturia    Personal history of colonic polyps    Shortness of breath    Raynaud's disease    Overweight with body mass index (BMI) of 29 to 29.9 in adult       Comprehensive Medical/Surgical/Social/Family History  History reviewed. No pertinent past medical history.  Past Surgical History:   Procedure Laterality Date    OTHER SURGICAL HISTORY  10/28/2019    Cataract surgery    OTHER SURGICAL HISTORY  10/28/2019    Colonoscopy    OTHER SURGICAL HISTORY  10/28/2019    Esophagogastroduodenoscopy    OTHER SURGICAL HISTORY  10/28/2019    Back surgery    OTHER SURGICAL HISTORY  06/22/2021    Colonoscopy     Social History     Tobacco Use    Smoking status: Former     Types: Cigarettes    Smokeless tobacco: Former   Vaping Use    Vaping status: Never Used   Substance Use Topics    Alcohol use: Not Currently    Drug use: Never     Family History   Problem Relation Name Age of Onset    Kidney cancer Mother          s/p nephrectomy and chemo    Coronary artery disease Father          multiple small MIs    Heart attack Father      Colon cancer Paternal Great-Grandmother           Allergies and Medications  Patient has no known allergies.  Current Outpatient Medications on File Prior to Visit   Medication Sig Dispense Refill    aspirin 81 mg EC tablet Take 1 tablet (81 mg) by mouth once daily. As  directed      atorvastatin (Lipitor) 10 mg tablet Take 1 tablet (10 mg) by mouth once daily at bedtime. 90 tablet 3    cilostazol (Pletal) 100 mg tablet Take 1 tablet (100 mg) by mouth 2 times a day. 180 tablet 3    omeprazole (PriLOSEC) 40 mg DR capsule Take 1 capsule (40 mg) by mouth once daily. 90 capsule 3    prazosin (Minipress) 1 mg capsule Take 1 capsule (1 mg) by mouth 2 times a day. 180 capsule 3    sildenafil (Revatio) 20 mg tablet Take 1 tablet (20 mg) by mouth 5 times a day. (2 tabs in morning/ 1 tab at noon / 2 tabs in evening) 150 tablet 11    [DISCONTINUED] carvedilol (Coreg) 6.25 mg tablet Take 1 tablet (6.25 mg) by mouth 2 times a day. 90 tablet 3    [DISCONTINUED] losartan (Cozaar) 50 mg tablet Take 1 tablet (50 mg) by mouth once daily. As directed 90 tablet 3     No current facility-administered medications on file prior to visit.       Medicare Wellness Questionnaire        How have you been on Medicare less than a year ? No  Have you had a Medicare Wellness exam before ? Yes  Have you had any surgeries in the last year ? No  Have you developed any new diseases in the last year ? No  Have any close family members developed new diseases in the last year ? No  Have you been to a the hospital in the last year ? No  Do you take any pills or supplements other than those prescribed for you ? Yes  Do you take any opiates for pain such as Tramadol, Percocet or Norco ? No  How do you consider your overall health ?Good  Have you ever used tobacco products ? Yes   Have you smoked more than 100 cigarettes in your life ?  Yes  What form of tobacco have you used ? Cigarettes  How many packs per day ? 2ppd x 50 years, quit in 2018 How many years ? 60 yeas   Have you quit ? Yes  Do you drink alcohol ?  No   Have you ever used illegal drugs at anytime in your life including Marijuana ? No   Which of the following describes your diet ?Well balanced, tries to eat healthy   How many days per week on average do you  "exercise ? 0 days  Do you have any loss of hearing ? No  Do you have hearing aids ? No  Have you or others noted you have loss of memory ? No  Do you need someone to assist you with any of the following ? None   Do you need someone to assist you with any of the following ? None   Have you fallen in the last 6 months ? No  Do you have any of the following in your house ?  None   Do you have a living will ? No   Do you have a durable power of  for health care decisions ? No    Medications and Supplements  prescribed by me and other practitioners or clinical pharmacist (such as prescriptions, OTC's, herbal therapies and supplements) were reviewed and documented in the medical record.    Tobacco/Alcohol/Opioid use, as well as Illicit Drug Use was screened for/reviewed and documented in Social History section and medication list as appropriate  Activities of Daily Living  In your present state of health, do you have any difficulty performing the following activities?:   Preparing food and eating?: No  Bathing yourself: No  Getting dressed: No  Using the toilet:No  Moving around from place to place: No  In the past year have you fallen or had a near fall?:No    Depression Screen  (Note: if answer to either of the following is \"Yes\", then a more complete depression screening is indicated)   Q1: Over the past two weeks, have you felt down, depressed or hopeless?   No   Q2: Over the past two weeks, have you felt little interest or pleasure in doing things?   No     Depression screening was completed and was negative.     Current exercise habits: The patient does not participate in regular exercise at present.   Dietary issues discussed: Yes  Hearing difficulties: No  Safe in current home environment: yes  Visual Acuity assessed: no  Cognitive Impairment assessed: yes       Advance directives  Advanced Care Planning (including a Living Will, Healthcare POA, as well as specific end of life choices and/or directives), " "was discussed for approximately 1 minutes with the patient and/or surrogate, voluntarily, and documented in the medical record.     Cardiac Risk Assessment  Cardiovascular risk was discussed and, if needed, lifestyle modifications recommended, including nutritional choices, exercise, and elimination of habits contributing to risk. We agreed on a plan to reduce the current cardiovascular risk based on above discussion as needed.  Aspirin use/disuse was discussed after reviewing the updated guidelines below:    Consider low dose Aspirin ( mg) use if the benefit for cardiovascular disease prevention outweighs risk for bleeding complications.   In general, low dose ASA should be considered:  In patients WITHOUT prior MI/stroke/PAD (primary prevention):   a. Age <60: Use if 10-year cardiovascular disease risk >20%, with discussion of risks and benefits with patient  b. Age 60-<70: Use if 10-year cardiovascular disease risk >20% and low bleeding (e.g., gastrointenstinal) risk, with discussion of risks and benefits with patient  c. Age >=70: Do not use    In patients WITH prior MI/stroke/PAD (secondary prevention):   Generally use unless extremely high bleeding (e.g., gastrointenstinal) risk, with discussion of risks and benefits with patient    ROS otherwise negative aside from what was mentioned above in HPI.    Vitals  /78   Pulse 87   Ht 1.651 m (5' 5\")   Wt 80.7 kg (178 lb)   BMI 29.62 kg/m²   Body mass index is 29.62 kg/m².  Physical Exam  Gen: Alert, NAD  HEENT:  PERRLA, EOMI, conjunctiva and sclera normal in appearance.   Neck:  Supple with FROM; No masses/nodes palpable; Thyroid nontender and without nodules; No LAKESHIA  Respiratory:  Lungs CTAB  Cardiovascular:  Heart RRR. No M/R/G. Peripheral pulses equal bilaterally  Abdomen:  Soft, nontender, BS present throughout; No R/G/R; No HSM or masses palpated  Extremities:  FROM all extremities; Muscle strength grossly normal with good tone  Neuro:  CN " II-XII intact; Reflexes 2+/2+; Gross motor and sensory intact  Skin:  No suspicious lesions present      Assessment and Plan:  Problem List Items Addressed This Visit       Benign hypertension with CKD (chronic kidney disease) stage III (Multi)    Overview     Chronic condition documentation: stable based on last GFR. Continue established treatment plan including avoidance of nephrotoxins and follow up at least yearly.         Benign prostatic hyperplasia with urinary obstruction and other lower urinary tract symptoms    GERD (gastroesophageal reflux disease)    HTN (hypertension)    Relevant Medications    losartan (Cozaar) 50 mg tablet    carvedilol (Coreg) 6.25 mg tablet    Hyperlipidemia    Raynaud's disease - Primary      HTN, controlled   - continue losartan 50mg po daily      2. Raynauds, severe, stable   - following with vascular   - on revatio 20mg po 5x a day   - on pletal 100mg po bid   - on coreg 6.25mg po daily   - on asa 81mg po daily      3.  HLD   -    - on lipitor 10mg po daily      4. Follows with Dr King, Psa ordered      Immunizations   Flu shot 2022, declines   COVID received   Pna received   Shingles received   RSV recommended      PSA 2023, 2024   Colon caner screening 2021     Advised pt that I will be leaving  at the end of Feb 2025.     During the course of the visit the patient was educated and counseled about age appropriate screening and preventive services. Completed preventive screenings were documented in the chart and orders were placed for outstanding screenings/procedures as documented in the Assessment and Plan.      Patient Instructions (the written plan) was given to the patient at check out.      Cheryl Gould DO     62

## 2025-06-17 ENCOUNTER — APPOINTMENT (OUTPATIENT)
Age: 75
End: 2025-06-17
Payer: MEDICARE

## 2025-06-19 NOTE — PROGRESS NOTES
CHIEF COMPLAINT  1 year follow up     HISTORY OF PRESENT ILLNESS    Patient presents for routine follow up. He reports worsening shortness of breath over the last few months. He also reports claudication to lower legs bilaterally. Patient denies any chest pain/pressure/discomfort, palpitations, or rest pain.       Cardiovascular hx:    1.Raynaud's disease:  -Current medical therapy includes sildenafil and cilostazol  -Patient reports Raynaud's has remained quite stable with no complications or ulcerations in the last year  -Patient continues to be aware of provoking factors and avoids colder temperatures when possible   -Patient reports med compliance      Cardiovascular testing:  Echo (January, 2022)-LVSF is normal with a 55-60% EF; grade II diastolic dysfunction   2. Carotid duplex (January, 2022)-<50% stenosis of the right and left proximal ICA; heterogenous plaque bilaterally              Past Medical, Surgical, and Family History reviewed and updated in chart.     Reviewed all medications by prescribing practitioner or clinical pharmacist (such as prescriptions, OTCs, herbal therapies and supplements) and documented in the medical record.    Past Medical History  Medical History[1]    Social History  Social History[2]    Family History   Family History[3]     Allergies:  RX Allergies[4]     Outpatient Medications:  Current Outpatient Medications   Medication Instructions    aspirin 81 mg EC tablet 1 tablet, Daily    atorvastatin (LIPITOR) 40 mg, oral, Nightly    carvedilol (COREG) 6.25 mg, oral, 2 times daily    cilostazol (PLETAL) 100 mg, oral, 2 times daily    losartan (COZAAR) 50 mg, oral, Daily, As directed    omeprazole (PRILOSEC) 40 mg, oral, Daily    prazosin (MINIPRESS) 1 mg, oral, 2 times daily    sildenafil (REVATIO) 20 mg, oral, 5 times daily, (2 tabs in morning/ 1 tab at noon / 2 tabs in evening)          Labs:   CMP:  Recent Labs     12/02/24  1024 12/21/23  1230 07/06/23  1306 03/13/23  1247  "05/10/21  1410 12/01/20  1230    141 137 139  --  141   K 4.2 4.5 5.0 4.6  --  4.5   * 107 104 104  --  110*   CO2 30 28 29 28  --  26   ANIONGAP 9* 11 9* 12  --  10   BUN 21 18 18 15 17 24*   CREATININE 1.32* 1.34* 1.60* 1.36*  --  1.31*   EGFR 58* 57*  --   --   --   --      Recent Labs     12/02/24  1024 12/21/23  1230 03/13/23  1247 12/01/20  1230 10/09/19  1602   ALBUMIN 3.9 4.4 4.3 4.0 4.3   ALKPHOS 46 49 41 53 46   ALT 10 14 12 14 30   AST 9 13 12 13 19   BILITOT 0.6 0.6 0.8 0.5 0.7     CBC:  Recent Labs     03/13/23  1247   WBC 4.7   HGB 13.1*   HCT 41.2      MCV 93     COAG: No results for input(s): \"PTT\", \"INR\", \"HAUF\", \"DDIMERVTE\", \"HAPTOGLOBIN\", \"FIBRINOGEN\" in the last 40160 hours.  ABO: No results for input(s): \"ABO\" in the last 79794 hours.  HEME/ENDO:  Recent Labs     12/02/24  1024   TSH 0.92      CARDIAC: No results for input(s): \"LDH\", \"CKMB\", \"TROPHS\", \"BNP\" in the last 33628 hours.    No lab exists for component: \"CK\", \"CKMBP\"  Recent Labs     12/02/24  1024 12/21/23  1230 12/01/20  1230 10/09/19  1602   CHOL 159 152 156 143   LDLF  --   --  98 85   HDL 45.0 48.0 43.0 45.0   TRIG 57 47 73 67     MICRO: No results for input(s): \"ESR\", \"CRP\", \"PROCAL\" in the last 35484 hours.  No results found for the last 90 days.    Notable Studies: imaging personally reviewed   EKG:No results found for this or any previous visit (from the past 4464 hours).  Echocardiogram:   [place] [address]  Phone [phone], Fax [phone]    TRANSTHORACIC ECHOCARDIOGRAM REPORT      Patient Name:     ALFONSO MEJIA Reading Physician:   [id number] Ron Gonzales MD  Study Date:       1/3/2022         Referring Physician: [id number] HINKLE  MRN/PID:          [id number]         PCP:  Accession/Order#: [id number]     Department Location: [facility]  YOB: 1950        Fellow:  Gender:          "  M                Nurse:  Admit Date:                        Sonographer:         Franchesca Rick RVT, [facility]  Admission Status: Outpatient       Additional Staff:  Height:           170.18 cm        CC Report to:  Weight:           81.65 kg         Study Type:          Echocardiogram  BSA:              1.93 m2  Blood Pressure: 136 /74 mmHg    Diagnosis/ICD: R06.02-Shortness of breath  Indication:    Dyspnea on Exertion  Procedure/CPT: Echo Complete w Full [id number]    Patient History:  Pertinent History: No previous echo.    Study Detail: The following Echo studies were performed: 2D, M-Mode, Doppler and  color flow. A bubble study was not performed. The patient was  awake.      PHYSICIAN INTERPRETATION:  Left Ventricle: The left ventricular systolic function is normal, with an estimated ejection fraction of 55-60%. There are no regional wall motion abnormalities. The left ventricular cavity size is normal. Spectral Doppler shows a pseudonormal pattern of left ventricular diastolic filling.  Left Atrium: The left atrium is normal in size.  Right Ventricle: The right ventricle is normal in size. There is normal right ventricular global systolic function.  Right Atrium: The right atrium is normal in size.  Aortic Valve: The aortic valve was not well visualized. There is no evidence of aortic valve regurgitation. The peak instantaneous gradient of the aortic valve is 6.4 mmHg. The mean gradient of the aortic valve is 4.0 mmHg. Calcified aortic valve leaflets with normal opening in systole. Number of leaflets could not be adequately delineated.  Mitral Valve: The mitral valve is normal in structure. There is no evidence of mitral valve regurgitation. Calcified mitral annulus and leaflets.  Tricuspid Valve: The tricuspid valve is structurally normal. No evidence of tricuspid regurgitation.  Pulmonic Valve: The pulmonic valve is not well visualized. There is no indication of pulmonic valve regurgitation.  Pericardium:  There is no pericardial effusion noted.  Aorta: The aortic root is normal.  Systemic Veins: The inferior vena cava appears to be of normal size. There is IVC inspiratory collapse greater than 50%.      CONCLUSIONS:  1. The left ventricular systolic function is normal with a 55-60% estimated ejection fraction.  2. Spectral Doppler shows a pseudonormal pattern of left ventricular diastolic filling.    QUANTITATIVE DATA SUMMARY:  2D MEASUREMENTS:  Normal Ranges:  Ao Root d:     3.30 cm   (2.0-3.7cm)  LAs:           3.10 cm   (2.7-4.0cm)  IVSd:          1.11 cm   (0.6-1.1cm)  LVPWd:         1.15 cm   (0.6-1.1cm)  LVIDd:         4.23 cm   (3.9-5.9cm)  LVIDs:         2.78 cm  LV Mass Index: 85.4 g/m2  LV % FS        34.3 %    LA VOLUME:  Normal Ranges:  LA Vol A4C:        33.9 ml    (22+/-6mL/m2)  LA Vol A2C:        42.1 ml  LA Vol BP:         44.7 ml  LA Vol Index A4C:  17.5ml/m2  LA Vol Index A2C:  21.8 ml/m2  LA Vol Index BP:   23.1 ml/m2  LA Area A4C:       15.3 cm2  LA Area A2C:       14.4 cm2  LA Major Axis A4C: 5.9 cm  LA Major Axis A2C: 4.2 cm  LA Volume Index:   20.3 ml/m2  LA Vol A4C:        35.2 ml  LA Vol A2C:        39.1 ml    M-MODE MEASUREMENTS:  Normal Ranges:  AoV Exc: 1.40 cm (1.5-2.5cm)    AORTA MEASUREMENTS:  Normal Ranges:  AoV Exc: 1.40 cm (1.5-2.5cm)    LV SYSTOLIC FUNCTION BY 2D PLANIMETRY (MOD):  Normal Ranges:  EF-A4C View: 50.2 % (>55%)  EF-A2C View: 67.5 %  EF-Biplane:  57.3 %    LV DIASTOLIC FUNCTION:  Normal Ranges:  MV Peak E:    0.66 m/s (0.7-1.2 m/s)  MV Peak A:    0.66 m/s (0.42-0.7 m/s)  E/A Ratio:    1.00     (1.0-2.2)  MV e'         0.06 m/s (>8.0)  MV lateral e' 0.09 m/s  MV medial e'  0.06 m/s  E/e' Ratio:   11.08    (<8.0)    MITRAL VALVE:  Normal Ranges:  MV DT: 306 msec (150-240msec)    AORTIC VALVE:  Normal Ranges:  AoV Vmax:                1.26 m/s (<1.7m/s)  AoV Peak P.4 mmHg (<20mmHg)  AoV Mean P.0 mmHg (1.7-11.5mmHg)  LVOT Max Farshad:             0.85 m/s (<1.1m/s)  AoV VTI:                 26.50 cm (18-25cm)  LVOT VTI:                18.30 cm  LVOT Diameter:           2.10 cm  (1.8-2.4cm)  AoV Area, VTI:           2.39 cm2 (2.5-5.5cm2)  AoV Area,Vmax:           2.34 cm2 (2.5-4.5cm2)  AoV Dimensionless Index: 0.69    RIGHT VENTRICLE:  RV 1   3.40 cm  RV 2   2.17 cm  RV 3   5.86 cm  TAPSE: 18.4 mm    PULMONIC VALVE:  Normal Ranges:  PV Accel Time: 120 msec (>120ms)  PV Max Farshad:    1.0 m/s  (0.6-0.9m/s)  PV Max PG:     3.8 mmHg      [id number] Ron Gonzales MD  Electronically signed on 1/3/2022 at 12:58:42 PM      Stress Testing: No results found for this or any previous visit from the past 1825 days.    Cardiac Catheterization: No results found for this or any previous visit from the past 1825 days.  No results found for this or any previous visit from the past 3650 days.         REVIEW OF SYSTEMS  A 10-point system review was completed and was negative except as noted in the HPI.      VITALS  Vitals:    06/23/25 1421   BP: 96/60   Pulse: 64   SpO2: 100%       PHYSICAL EXAM  General: awake, alert and oriented. No acute distress.   Skin: Skin is warm, dry and intact without rashes or lesions.  HEENT: normocephalic, atraumatic; conjunctivae are clear without exudates or hemorrhage. Sclera is non-icteric. Eyelids are normal in appearance without swelling or lesions. Hearing intact. Nares are patent bilaterally. Moist mucous membranes.   Cardiovascular: heart rate and rhythm are normal. No murmurs, gallops, or rubs are auscultated. S1 and S2 are heard and are of normal intensity. no carotid bruits  Respiratory: bilateral lung sounds clear to auscultations without rales, rhonchi, or wheezes. No accessory muscle use or stridor  Musculoskeletal: ROM intact, no deformities  Extremities: DP and PT pulses palpable bilaterally; no wounds; no rest pain  Neurological: no focal deficits; gait steady  Psychiatric: appropriate mood and affect; good judgment and  insight          ASSESSMENT AND PLAN  Assessment/Plan   Diagnoses and all orders for this visit:  Coronary artery calcification seen on CT scan   Dyspnea on exertion  -Will order a surveillance echo to rule out any underlying structural abnormalities  -CT scan in December showing severe coronary artery calcifications; will order a nuclear stress test. Patient aware if abnormal, a C will be recommended    Claudication  -     Vascular US Ankle Brachial Index (TANGELA) With Exercise; Future  -Continue ASA, statin, and cilostazol     Mixed hyperlipidemia  -Recent LDL not at goal; will increase atorvastatin to 40mg daily  -Continue ASA 81mg daily         RTC: after testing       Thank you for allowing me to participate in the care of this patient. Please reach me out if you have any questions or if you need any clarifications regarding the patient's care.    Gertrudis Sanabria, KYE, APRN, FNP-C  Division of Cardiovascular Medicine  [facility]  [facility]         [1] History reviewed. No pertinent past medical history.  [2]   Social History  Tobacco Use    Smoking status: Former     Types: Cigarettes    Smokeless tobacco: Former   Vaping Use    Vaping status: Never Used   Substance Use Topics    Alcohol use: Not Currently    Drug use: Never   [3]   Family History  Problem Relation Name Age of Onset    Kidney cancer Mother          s/p nephrectomy and chemo    Coronary artery disease Father          multiple small MIs    Heart attack Father      Colon cancer Paternal Great-Grandmother     [4] No Known Allergies

## 2025-06-23 ENCOUNTER — APPOINTMENT (OUTPATIENT)
Dept: CARDIOLOGY | Facility: CLINIC | Age: 75
End: 2025-06-23
Payer: MEDICARE

## 2025-06-23 VITALS
SYSTOLIC BLOOD PRESSURE: 96 MMHG | HEIGHT: 65 IN | HEART RATE: 64 BPM | WEIGHT: 172.2 LBS | BODY MASS INDEX: 28.69 KG/M2 | OXYGEN SATURATION: 100 % | DIASTOLIC BLOOD PRESSURE: 60 MMHG

## 2025-06-23 DIAGNOSIS — I73.9 CLAUDICATION: ICD-10-CM

## 2025-06-23 DIAGNOSIS — R06.09 DYSPNEA ON EXERTION: Primary | ICD-10-CM

## 2025-06-23 DIAGNOSIS — I25.10 CORONARY ARTERY CALCIFICATION SEEN ON CT SCAN: ICD-10-CM

## 2025-06-23 DIAGNOSIS — E78.2 MIXED HYPERLIPIDEMIA: ICD-10-CM

## 2025-06-23 PROCEDURE — 1160F RVW MEDS BY RX/DR IN RCRD: CPT

## 2025-06-23 PROCEDURE — G2211 COMPLEX E/M VISIT ADD ON: HCPCS

## 2025-06-23 PROCEDURE — 99215 OFFICE O/P EST HI 40 MIN: CPT

## 2025-06-23 PROCEDURE — 93000 ELECTROCARDIOGRAM COMPLETE: CPT

## 2025-06-23 PROCEDURE — 1036F TOBACCO NON-USER: CPT

## 2025-06-23 PROCEDURE — 3008F BODY MASS INDEX DOCD: CPT

## 2025-06-23 PROCEDURE — 1159F MED LIST DOCD IN RCRD: CPT

## 2025-06-23 PROCEDURE — 3074F SYST BP LT 130 MM HG: CPT

## 2025-06-23 PROCEDURE — 3078F DIAST BP <80 MM HG: CPT

## 2025-06-23 RX ORDER — ATORVASTATIN CALCIUM 40 MG/1
40 TABLET, FILM COATED ORAL NIGHTLY
Qty: 90 TABLET | Refills: 3 | Status: SHIPPED | OUTPATIENT
Start: 2025-06-23 | End: 2026-06-23

## 2025-07-03 ENCOUNTER — HOSPITAL ENCOUNTER (OUTPATIENT)
Dept: RADIOLOGY | Facility: HOSPITAL | Age: 75
Discharge: HOME | End: 2025-07-03
Payer: MEDICARE

## 2025-07-03 ENCOUNTER — HOSPITAL ENCOUNTER (OUTPATIENT)
Dept: CARDIOLOGY | Facility: HOSPITAL | Age: 75
Discharge: HOME | End: 2025-07-03
Payer: MEDICARE

## 2025-07-03 ENCOUNTER — HOSPITAL ENCOUNTER (OUTPATIENT)
Dept: VASCULAR MEDICINE | Facility: HOSPITAL | Age: 75
Discharge: HOME | End: 2025-07-03
Payer: MEDICARE

## 2025-07-03 VITALS
HEART RATE: 64 BPM | BODY MASS INDEX: 28.66 KG/M2 | SYSTOLIC BLOOD PRESSURE: 140 MMHG | WEIGHT: 172 LBS | DIASTOLIC BLOOD PRESSURE: 78 MMHG | HEIGHT: 65 IN

## 2025-07-03 DIAGNOSIS — R06.09 DYSPNEA ON EXERTION: ICD-10-CM

## 2025-07-03 DIAGNOSIS — I73.9 CLAUDICATION: ICD-10-CM

## 2025-07-03 DIAGNOSIS — I70.213 ATHEROSCLEROSIS OF NATIVE ARTERIES OF EXTREMITIES WITH INTERMITTENT CLAUDICATION, BILATERAL LEGS: ICD-10-CM

## 2025-07-03 LAB — BODY SURFACE AREA: 1.89 M2

## 2025-07-03 PROCEDURE — 93924 LWR XTR VASC STDY BILAT: CPT

## 2025-07-03 PROCEDURE — 93016 CV STRESS TEST SUPVJ ONLY: CPT | Performed by: STUDENT IN AN ORGANIZED HEALTH CARE EDUCATION/TRAINING PROGRAM

## 2025-07-03 PROCEDURE — A9502 TC99M TETROFOSMIN: HCPCS

## 2025-07-03 PROCEDURE — 3430000001 HC RX 343 DIAGNOSTIC RADIOPHARMACEUTICALS

## 2025-07-03 PROCEDURE — 78452 HT MUSCLE IMAGE SPECT MULT: CPT | Performed by: INTERNAL MEDICINE

## 2025-07-03 PROCEDURE — 93017 CV STRESS TEST TRACING ONLY: CPT

## 2025-07-03 PROCEDURE — 93018 CV STRESS TEST I&R ONLY: CPT | Performed by: STUDENT IN AN ORGANIZED HEALTH CARE EDUCATION/TRAINING PROGRAM

## 2025-07-03 PROCEDURE — 2500000004 HC RX 250 GENERAL PHARMACY W/ HCPCS (ALT 636 FOR OP/ED)

## 2025-07-03 PROCEDURE — 78452 HT MUSCLE IMAGE SPECT MULT: CPT

## 2025-07-03 PROCEDURE — 93306 TTE W/DOPPLER COMPLETE: CPT

## 2025-07-03 PROCEDURE — 93924 LWR XTR VASC STDY BILAT: CPT | Performed by: INTERNAL MEDICINE

## 2025-07-03 RX ORDER — REGADENOSON 0.08 MG/ML
0.4 INJECTION, SOLUTION INTRAVENOUS
Status: COMPLETED | OUTPATIENT
Start: 2025-07-03 | End: 2025-07-03

## 2025-07-03 RX ORDER — AMINOPHYLLINE 25 MG/ML
125 INJECTION, SOLUTION INTRAVENOUS AS NEEDED
OUTPATIENT
Start: 2025-07-03

## 2025-07-03 RX ADMIN — TETROFOSMIN 10.5 MILLICURIE: 0.23 INJECTION, POWDER, LYOPHILIZED, FOR SOLUTION INTRAVENOUS at 08:07

## 2025-07-03 RX ADMIN — REGADENOSON 0.4 MG: 0.08 INJECTION, SOLUTION INTRAVENOUS at 09:17

## 2025-07-03 RX ADMIN — TETROFOSMIN 34.2 MILLICURIE: 0.23 INJECTION, POWDER, LYOPHILIZED, FOR SOLUTION INTRAVENOUS at 09:18

## 2025-07-08 ENCOUNTER — APPOINTMENT (OUTPATIENT)
Dept: CARDIOLOGY | Facility: CLINIC | Age: 75
End: 2025-07-08
Payer: MEDICARE

## 2025-07-08 VITALS
HEIGHT: 65 IN | SYSTOLIC BLOOD PRESSURE: 144 MMHG | WEIGHT: 173 LBS | DIASTOLIC BLOOD PRESSURE: 86 MMHG | BODY MASS INDEX: 28.82 KG/M2 | OXYGEN SATURATION: 97 % | HEART RATE: 64 BPM

## 2025-07-08 DIAGNOSIS — Z71.2 ENCOUNTER TO DISCUSS TEST RESULTS: ICD-10-CM

## 2025-07-08 DIAGNOSIS — I51.7 LVH (LEFT VENTRICULAR HYPERTROPHY): ICD-10-CM

## 2025-07-08 DIAGNOSIS — G47.62 NOCTURNAL LEG CRAMPS: ICD-10-CM

## 2025-07-08 DIAGNOSIS — I49.3 PVC (PREMATURE VENTRICULAR CONTRACTION): ICD-10-CM

## 2025-07-08 DIAGNOSIS — R06.09 DYSPNEA ON EXERTION: Primary | ICD-10-CM

## 2025-07-08 PROBLEM — I73.9 CLAUDICATION: Status: RESOLVED | Noted: 2025-06-23 | Resolved: 2025-07-08

## 2025-07-08 LAB
AORTIC VALVE MEAN GRADIENT: 4 MMHG
AORTIC VALVE PEAK VELOCITY: 1.54 M/S
AV PEAK GRADIENT: 9 MMHG
AVA (PEAK VEL): 1.88 CM2
AVA (VTI): 2 CM2
EJECTION FRACTION APICAL 4 CHAMBER: 69
EJECTION FRACTION: 68 %
LEFT ATRIUM VOLUME AREA LENGTH INDEX BSA: 11.5 ML/M2
LEFT VENTRICLE INTERNAL DIMENSION DIASTOLE: 4.34 CM (ref 3.5–6)
LEFT VENTRICULAR OUTFLOW TRACT DIAMETER: 2.1 CM
LV EJECTION FRACTION BIPLANE: 70 %
MITRAL VALVE E/A RATIO: 0.72
RIGHT VENTRICLE PEAK SYSTOLIC PRESSURE: 31 MMHG
TRICUSPID ANNULAR PLANE SYSTOLIC EXCURSION: 2 CM

## 2025-07-08 PROCEDURE — 1036F TOBACCO NON-USER: CPT

## 2025-07-08 PROCEDURE — 3077F SYST BP >= 140 MM HG: CPT

## 2025-07-08 PROCEDURE — 3008F BODY MASS INDEX DOCD: CPT

## 2025-07-08 PROCEDURE — 1160F RVW MEDS BY RX/DR IN RCRD: CPT

## 2025-07-08 PROCEDURE — 99214 OFFICE O/P EST MOD 30 MIN: CPT

## 2025-07-08 PROCEDURE — 1159F MED LIST DOCD IN RCRD: CPT

## 2025-07-08 PROCEDURE — G2211 COMPLEX E/M VISIT ADD ON: HCPCS

## 2025-07-08 PROCEDURE — 3079F DIAST BP 80-89 MM HG: CPT

## 2025-07-08 PROCEDURE — 93000 ELECTROCARDIOGRAM COMPLETE: CPT

## 2025-07-08 RX ORDER — NAPROXEN 250 MG/1
250 TABLET ORAL
COMMUNITY

## 2025-07-08 NOTE — PROGRESS NOTES
CHIEF COMPLAINT  Follow up testing     HISTORY OF PRESENT ILLNESS    Patient presents for follow up and test results. He reports increased leg cramps since increasing his atorvastatin to 40mg daily. No other major complaints at today's visit.     Cardiovascular hx:    1.Raynaud's disease:  -Current medical therapy includes sildenafil and cilostazol  -Patient reports Raynaud's has remained quite stable with no complications or ulcerations in the last year  -Patient continues to be aware of provoking factors and avoids colder temperatures when possible   -Patient reports med compliance       Cardiovascular testing:  Echo (July, 2025)-LVEF is normal at 65-70%; grade I diastolic dysfunction; slightly elevated RVSP; moderate LVH  Nuclear stress test (July, 2025)-negative for ischemia  ABIs (July, 2025)-no evidence of arterial occlusive disease bilaterally         Past Medical, Surgical, and Family History reviewed and updated in chart.     Reviewed all medications by prescribing practitioner or clinical pharmacist (such as prescriptions, OTCs, herbal therapies and supplements) and documented in the medical record.    Past Medical History  Medical History[1]    Social History  Social History[2]    Family History   Family History[3]     Allergies:  RX Allergies[4]     Outpatient Medications:  Current Outpatient Medications   Medication Instructions    aspirin 81 mg EC tablet 1 tablet, Daily    atorvastatin (LIPITOR) 40 mg, oral, Nightly    carvedilol (COREG) 6.25 mg, oral, 2 times daily    cilostazol (PLETAL) 100 mg, oral, 2 times daily    losartan (COZAAR) 50 mg, oral, Daily, As directed    naproxen (NAPROSYN) 250 mg, 2 times daily (morning and late afternoon)    omeprazole (PRILOSEC) 40 mg, oral, Daily    prazosin (MINIPRESS) 1 mg, oral, 2 times daily    sildenafil (REVATIO) 20 mg, oral, 5 times daily, (2 tabs in morning/ 1 tab at noon / 2 tabs in evening)          Labs:   CMP:  Recent Labs     12/02/24  1024  "12/21/23  1230 07/06/23  1306 03/13/23  1247 05/10/21  1410 12/01/20  1230    141 137 139  --  141   K 4.2 4.5 5.0 4.6  --  4.5   * 107 104 104  --  110*   CO2 30 28 29 28  --  26   ANIONGAP 9* 11 9* 12  --  10   BUN 21 18 18 15 17 24*   CREATININE 1.32* 1.34* 1.60* 1.36*  --  1.31*   EGFR 58* 57*  --   --   --   --      Recent Labs     12/02/24  1024 12/21/23  1230 03/13/23  1247 12/01/20  1230 10/09/19  1602   ALBUMIN 3.9 4.4 4.3 4.0 4.3   ALKPHOS 46 49 41 53 46   ALT 10 14 12 14 30   AST 9 13 12 13 19   BILITOT 0.6 0.6 0.8 0.5 0.7     CBC:  Recent Labs     03/13/23  1247   WBC 4.7   HGB 13.1*   HCT 41.2      MCV 93     COAG: No results for input(s): \"PTT\", \"INR\", \"HAUF\", \"DDIMERVTE\", \"HAPTOGLOBIN\", \"FIBRINOGEN\" in the last 16264 hours.  ABO: No results for input(s): \"ABO\" in the last 23924 hours.  HEME/ENDO:  Recent Labs     12/02/24  1024   TSH 0.92      CARDIAC: No results for input(s): \"LDH\", \"CKMB\", \"TROPHS\", \"BNP\" in the last 85534 hours.    No lab exists for component: \"CK\", \"CKMBP\"  Recent Labs     12/02/24  1024 12/21/23  1230 12/01/20  1230 10/09/19  1602   CHOL 159 152 156 143   LDLF  --   --  98 85   HDL 45.0 48.0 43.0 45.0   TRIG 57 47 73 67     MICRO: No results for input(s): \"ESR\", \"CRP\", \"PROCAL\" in the last 07061 hours.  No results found for the last 90 days.    Notable Studies: imaging personally reviewed   EKG:No results found for this or any previous visit (from the past 4464 hours).  Echocardiogram:   [place] [address]  Phone [phone], Fax [phone]     TRANSTHORACIC ECHOCARDIOGRAM REPORT     Patient Name:       ALFONSO MEJIA    Reading Physician:    [id number] Singh Holley MD  Study Date:         7/3/2025            Ordering Provider:    [id number] CONCEPCIÓN CHAVEZ" DANETTE  MRN/PID:            [id number]            Fellow:  Accession#:         [id number]        Nurse:  Date of Birth/Age:  1950 / 74      Sonographer:          EL Monzon RVT  Gender Assigned at  M                   Additional Staff:  Birth:  Height:             170.18 cm           Admit Date:  Weight:             81.65 kg            Admission Status:     Outpatient  BSA / BMI:          1.93 m2 / 28.19     Department Location:  [facility]                      kg/m2  Blood Pressure: 136 /78 mmHg     Study Type:    TRANSTHORACIC ECHO (TTE) COMPLETE  Diagnosis/ICD: Other forms of dyspnea-R06.09  Indication:    Dyspnea  CPT Codes:     Echo Complete w Full Doppler-[id number]     Patient History:  Pertinent History: Previous echo 1-3-22.     Study Detail: The following Echo studies were performed: 2D, M-Mode, Doppler and                color flow. A bubble study was not performed. The patient was                awake.        PHYSICIAN INTERPRETATION:  Left Ventricle: Left ventricular ejection fraction is normal by visual estimate at 65-70%. There are no regional wall motion abnormalities. The left ventricular cavity size is normal. There is moderately increased septal and mildly increased posterior left ventricular wall thickness. There is left ventricular concentric remodeling. Spectral Doppler shows a Grade I (impaired relaxation pattern) of left ventricular diastolic filling with normal left atrial filling pressure.  Left Atrium: The left atrial size is normal. A bubble study using agitated saline was not performed.  Right Ventricle: The right ventricle is normal in size. There is normal right ventricular global systolic function.  Right Atrium: The right atrial size is normal.  Aortic Valve: The aortic valve is trileaflet. The aortic valve area by VTI is 2.00 cmï¿½ with a peak velocity of 1.54 m/s. The peak and mean gradients are 9 mmHg and 4  mmHg, respectively, with a dimensionless index of 0.58. There is mild to moderate aortic valve cusp calcification. There is mild to moderate aortic valve thickening. There is reduced systolic aortic valve leaflet excursion. There is no evidence of aortic valve regurgitation.  Mitral Valve: The mitral valve is mildly thickened. There is trace mitral valve regurgitation. The E Vmax is 0.64 m/s.  Tricuspid Valve: The tricuspid valve is structurally normal. There is trace tricuspid regurgitation. The Doppler estimated right ventricular systolic pressure (RVSP) is slightly elevated at 31 mmHg.  Pulmonic Valve: The pulmonic valve is structurally normal. There is trace pulmonic valve regurgitation.  Pericardium: Trivial pericardial effusion.  Aorta: The aortic root is normal.  Systemic Veins: The inferior vena cava appears normal in size, with IVC inspiratory collapse greater than 50%.  In comparison to the previous echocardiogram(s): Compared with study dated 1/3/2022, LVEF 65-70%, grade I diastolic dysfunction.        CONCLUSIONS:   1. Left ventricular ejection fraction is normal by visual estimate at 65-70%.   2. Spectral Doppler shows a Grade I (impaired relaxation pattern) of left ventricular diastolic filling with normal left atrial filling pressure.   3. There is normal right ventricular global systolic function.   4. The Doppler estimated RVSP is slightly elevated at 31 mmHg.   5. There is moderately increased septal and mildly increased posterior left ventricular wall thickness.     QUANTITATIVE DATA SUMMARY:     2D MEASUREMENTS:             Normal Ranges:  Ao Root d:       3.20 cm     (2.0-3.7cm)  LAs:             3.30 cm     (2.7-4.0cm)  IVSd:            1.37 cm     (0.6-1.1cm)  LVPWd:           1.30 cm     (0.6-1.1cm)  LVIDd:           4.34 cm     (3.9-5.9cm)  LVIDs:           2.78 cm  LV Mass Index:   113.4 g/m2  LVEDV Index:     41.84 ml/m2  LV % FS          35.9 %        LEFT ATRIUM:                  Normal  Ranges:  LA Vol A4C:        16.2 ml    (22+/-6mL/m2)  LA Vol A2C:        29.4 ml  LA Vol BP:         22.3 ml  LA Vol Index A4C:  8.4ml/m2  LA Vol Index A2C:  15.2 ml/m2  LA Vol Index BP:   11.5 ml/m2  LA Area A4C:       9.4 cm2  LA Area A2C:       12.3 cm2  LA Major Axis A4C: 4.6 cm  LA Major Axis A2C: 4.4 cm  LA Volume Index:   15.4 ml/m2  LA Vol A4C:        15.7 ml  LA Vol A2C:        29.7 ml  LA Vol Index BSA:  11.7 ml/m2        M-MODE MEASUREMENTS:         Normal Ranges:  AoV Exc:             1.10 cm (1.5-2.5cm)        AORTA MEASUREMENTS:         Normal Ranges:  AoV Exc:            1.10 cm (1.5-2.5cm)        LV SYSTOLIC FUNCTION:                       Normal Ranges:  EF-A4C View:    69 % (>=55%)  EF-A2C View:    68 %  EF-Biplane:     70 %  EF-Visual:      68 %  LV EF Reported: 68 %        LV DIASTOLIC FUNCTION:           Normal Ranges:  MV Peak E:             0.64 m/s  (0.7-1.2 m/s)  MV Peak A:             0.89 m/s  (0.42-0.7 m/s)  E/A Ratio:             0.72      (1.0-2.2)  MV e'                  0.081 m/s (>8.0)  MV lateral e'          0.09 m/s  MV medial e'           0.07 m/s  E/e' Ratio:            7.90      (<8.0)        MITRAL VALVE:          Normal Ranges:  MV DT:        313 msec (150-240msec)        AORTIC VALVE:                     Normal Ranges:  AoV Vmax:                1.54 m/s (<=1.7m/s)  AoV Peak P.5 mmHg (<20mmHg)  AoV Mean P.0 mmHg (1.7-11.5mmHg)  LVOT Max Farshad:            0.84 m/s (<=1.1m/s)  AoV VTI:                 32.70 cm (18-25cm)  LVOT VTI:                18.90 cm  LVOT Diameter:           2.10 cm  (1.8-2.4cm)  AoV Area, VTI:           2.00 cm2 (2.5-5.5cm2)  AoV Area,Vmax:           1.88 cm2 (2.5-4.5cm2)  AoV Dimensionless Index: 0.58        RIGHT VENTRICLE:  RV Basal 2.78 cm  RV Mid   2.07 cm  RV Major 6.1 cm  TAPSE:   20.3 mm        TRICUSPID VALVE/RVSP:          Normal Ranges:  Peak TR Velocity:     2.66 m/s  Est. RA Pressure:     3 mmHg  RV Syst Pressure:      31 mmHg  (< 30mmHg)  IVC Diam:             1.20 cm        PULMONIC VALVE:          Normal Ranges:  PV Accel Time:  95 msec  (>120ms)  PV Max Farshad:     1.2 m/s  (0.6-0.9m/s)  PV Max P.1 mmHg        [id number] Singh Holley MD  Electronically signed on 2025 at 6:21:05 AM           ** Final **       Stress Testing: No results found for this or any previous visit from the past 1825 days.    Cardiac Catheterization: No results found for this or any previous visit from the past 1825 days.  No results found for this or any previous visit from the past 3650 days.         REVIEW OF SYSTEMS  A 10-point system review was completed and was negative except as noted in the HPI.      VITALS  Vitals:    25 1118   BP: 144/86   Pulse: 64   SpO2: 97%       PHYSICAL EXAM  General: awake, alert and oriented. No acute distress.   Skin: Skin is warm, dry and intact without rashes or lesions.  HEENT: normocephalic, atraumatic; conjunctivae are clear without exudates or hemorrhage. Sclera is non-icteric. Eyelids are normal in appearance without swelling or lesions. Hearing intact. Nares are patent bilaterally. Moist mucous membranes.   Cardiovascular: heart rate and rhythm are normal. PVCs noted  Respiratory: bilateral lung sounds clear to auscultations without rales, rhonchi, or wheezes. No accessory muscle use or stridor  Neurological: no focal deficits; gait steady  Psychiatric: appropriate mood and affect; good judgment and insight      ASSESSMENT AND PLAN  Assessment/Plan   Diagnoses and all orders for this visit:  Dyspnea on exertion  Encounter to discuss test results  LVH (left ventricular hypertrophy)  PVC (premature ventricular contraction)  Nocturnal leg cramps    -We reviewed and discussed his recent echo, stress test, and ABIs showing no concerning findings.   -His EKG did show PVCs; patient asymptomatic  -Patient advised to continue the higher statin dose for another week and if still no improvement, will  decrease back to the 20mg dose        RTC: 6 months       Thank you for allowing me to participate in the care of this patient. Please reach me out if you have any questions or if you need any clarifications regarding the patient's care.    Gertrudis Sanabria DNP, APRN, FNP-C  Division of Cardiovascular Medicine  [facility]  [facility]         [1] History reviewed. No pertinent past medical history.  [2]   Social History  Tobacco Use    Smoking status: Former     Types: Cigarettes    Smokeless tobacco: Former   Vaping Use    Vaping status: Never Used   Substance Use Topics    Alcohol use: Not Currently    Drug use: Never   [3]   Family History  Problem Relation Name Age of Onset    Kidney cancer Mother          s/p nephrectomy and chemo    Coronary artery disease Father          multiple small MIs    Heart attack Father      Colon cancer Paternal Great-Grandmother     [4] No Known Allergies

## 2025-12-01 ENCOUNTER — APPOINTMENT (OUTPATIENT)
Age: 75
End: 2025-12-01
Payer: MEDICARE

## 2025-12-10 ENCOUNTER — APPOINTMENT (OUTPATIENT)
Dept: UROLOGY | Facility: CLINIC | Age: 75
End: 2025-12-10
Payer: MEDICARE

## 2026-01-13 ENCOUNTER — APPOINTMENT (OUTPATIENT)
Dept: CARDIOLOGY | Facility: CLINIC | Age: 76
End: 2026-01-13
Payer: MEDICARE